# Patient Record
Sex: FEMALE | Race: BLACK OR AFRICAN AMERICAN | Employment: FULL TIME | URBAN - METROPOLITAN AREA
[De-identification: names, ages, dates, MRNs, and addresses within clinical notes are randomized per-mention and may not be internally consistent; named-entity substitution may affect disease eponyms.]

---

## 2018-07-20 LAB
CHLAMYDIA, EXTERNAL: NEGATIVE
N. GONORRHEA, EXTERNAL: NEGATIVE

## 2018-07-31 LAB
ANTIBODY SCREEN, EXTERNAL: NEGATIVE
HBSAG, EXTERNAL: NEGATIVE
HCT, EXTERNAL: 35.7
HGB, EXTERNAL: 11.6
HIV, EXTERNAL: NEGATIVE
RUBELLA, EXTERNAL: NORMAL
T. PALLIDUM, EXTERNAL: NORMAL
TYPE, ABO & RH, EXTERNAL: NORMAL

## 2018-08-13 LAB — GTT, 1 HR, GLUCOLA, EXTERNAL: 87

## 2018-09-25 ENCOUNTER — HOSPITAL ENCOUNTER (OUTPATIENT)
Dept: PERINATAL CARE | Age: 28
Discharge: HOME | End: 2018-09-25
Attending: OBSTETRICS & GYNECOLOGY
Payer: OTHER GOVERNMENT

## 2018-09-25 PROCEDURE — 76811 OB US DETAILED SNGL FETUS: CPT | Performed by: OBSTETRICS & GYNECOLOGY

## 2018-11-15 ENCOUNTER — HOSPITAL ENCOUNTER (EMERGENCY)
Age: 28
Discharge: HOME | End: 2018-11-15
Attending: OBSTETRICS & GYNECOLOGY | Admitting: OBSTETRICS & GYNECOLOGY
Payer: OTHER GOVERNMENT

## 2018-11-15 VITALS
HEART RATE: 101 BPM | OXYGEN SATURATION: 100 % | RESPIRATION RATE: 20 BRPM | WEIGHT: 293 LBS | TEMPERATURE: 98.6 F | BODY MASS INDEX: 47.09 KG/M2 | DIASTOLIC BLOOD PRESSURE: 69 MMHG | SYSTOLIC BLOOD PRESSURE: 127 MMHG | HEIGHT: 66 IN

## 2018-11-15 PROCEDURE — 99282 EMERGENCY DEPT VISIT SF MDM: CPT

## 2018-11-15 PROCEDURE — 75810000275 HC EMERGENCY DEPT VISIT NO LEVEL OF CARE

## 2018-11-15 PROCEDURE — 93005 ELECTROCARDIOGRAM TRACING: CPT

## 2018-11-15 PROCEDURE — 74011250637 HC RX REV CODE- 250/637: Performed by: OBSTETRICS & GYNECOLOGY

## 2018-11-15 RX ORDER — PROGESTERONE 200 MG/1
200 CAPSULE ORAL DAILY
Status: ON HOLD | COMMUNITY
End: 2019-01-12

## 2018-11-15 RX ADMIN — ALUMINUM HYDROXIDE AND MAGNESIUM HYDROXIDE 15 ML: 200; 200 SUSPENSION ORAL at 20:36

## 2018-11-16 LAB
ATRIAL RATE: 116 BPM
CALCULATED P AXIS, ECG09: 69 DEGREES
CALCULATED R AXIS, ECG10: 14 DEGREES
CALCULATED T AXIS, ECG11: 48 DEGREES
DIAGNOSIS, 93000: NORMAL
P-R INTERVAL, ECG05: 136 MS
Q-T INTERVAL, ECG07: 310 MS
QRS DURATION, ECG06: 76 MS
QTC CALCULATION (BEZET), ECG08: 430 MS
VENTRICULAR RATE, ECG03: 116 BPM

## 2018-11-16 NOTE — H&P
History & Physical    Name: Smooth Jj MRN: [id number]  SSN: [id number]    YOB: 1990  Age: 29 y.o. Sex: female        Subjective:     Estimated Date of Delivery: None noted. OB History    Para Term  AB Living   5 2 1 1 2     SAB TAB Ectopic Molar Multiple Live Births   1 1              # Outcome Date GA Lbr Yuan/2nd Weight Sex Delivery Anes PTL Lv   5 Current            4 Term 2017 38w0d    Vag-Spont      3 TAB 2015           2   23w0d          1 SAB  8w0d                 Ms. Barry Gutiérrez is seen with pregnancy at 31 weeks for c/o chest pain which whe describes has burning sensation. Patient tried joe tea without relief Prenatal course was complicated by h/o FDIU at 24 weeks. Please see prenatal records for details. Past Medical History:   Diagnosis Date    Abnormal Papanicolaou smear of cervix     Gestational hypertension     previous pregnancy     History reviewed. No pertinent surgical history. Social History     Occupational History    Not on file   Tobacco Use    Smoking status: Never Smoker    Smokeless tobacco: Never Used   Substance and Sexual Activity    Alcohol use: No     Frequency: Never    Drug use: No    Sexual activity: Not on file     History reviewed. No pertinent family history. No Known Allergies  Prior to Admission medications    Medication Sig Start Date End Date Taking? Authorizing Provider   progesterone (PROMETRIUM) 200 mg capsule Insert 200 mg into vagina daily. Yes Provider, Historical   PNV No12-Iron-FA-DSS-OM-3 29 mg iron-1 mg -50 mg CPKD Take  by mouth.    Yes Provider, Historical        Review of Systems: Constitutional: negative  Respiratory: negative  Cardiovascular: negative  Gastrointestinal: negative  Genitourinary:negative  Musculoskeletal:negative  Neurological: negative  Behavioral/Psych: negative  Endocrine: negative  Allergic/Immunologic: negative    Objective:     Vitals:  Vitals:    11/15/18 1926 11/15/18 193 11/15/18 2008   BP: 136/81 127/69    Pulse: (!) 113 (!) 101    Resp: 18 20    Temp: 98.5 °F (36.9 °C) 98.6 °F (37 °C)    SpO2: 100%     Weight:   137.4 kg (303 lb)   Height:   5' 6\" (1.676 m)        Physical Exam:  Patient without distress.   Heart: Regular rate and rhythm  Lung: clear to auscultation throughout lung fields and normal respiratory effort  Abdomen: soft, nontender  Perineum: blood absent, amniotic fluid absent  Cervical Exam: deferred  Membranes:  Intact  Fetal Heart Rate: 130s cat I tracing appropriate for EGA     Prenatal Labs:   No results found for: ABORH, RUBELLAEXT, GRBSEXT, HBSAGEXT, HIVEXT, RPREXT, GONNOEXT, CHLAMEXT, ABORHEXT, RUBELLAEXT, GRBSEXT, HBSAGEXT, HIVEXT, RPREXT, GONNOEXT, CHLAMEXT      Assessment/Plan:     Heartburn     Plan  Patient given mallox and prilosec with relief  F/u in office with private MD       Signed By:  Stanley Ponce MD     November 15, 2018

## 2018-11-16 NOTE — ED PROVIDER NOTES
29 y.o. female with no significant past medical history presents with chief complaint of chest pain and lower abd pain that started around approximately 3 PM today. Pt reports that the pain does not feel like contractions. Pt adds that she is 31 weeks pregnant. There are no other acute medical concerns at this time. Social hx: None reported PCP: None Note written by Rebecca Lopez, as dictated by No att. providers found 7:24 PM 
 
 
 
The history is provided by the patient. No  was used. No past medical history on file. No past surgical history on file. No family history on file. Social History Socioeconomic History  Marital status:  Spouse name: Not on file  Number of children: Not on file  Years of education: Not on file  Highest education level: Not on file Social Needs  Financial resource strain: Not on file  Food insecurity - worry: Not on file  Food insecurity - inability: Not on file  Transportation needs - medical: Not on file  Transportation needs - non-medical: Not on file Occupational History  Not on file Tobacco Use  Smoking status: Not on file Substance and Sexual Activity  Alcohol use: Not on file  Drug use: Not on file  Sexual activity: Not on file Other Topics Concern  Not on file Social History Narrative  Not on file ALLERGIES: Patient has no known allergies. Review of Systems There were no vitals filed for this visit. Physical Exam  
 
MDM Procedures

## 2018-11-16 NOTE — DISCHARGE INSTRUCTIONS

## 2018-11-16 NOTE — PROGRESS NOTES
1935:  Patient to L&D for chest pain and some abdominal discomfort. She states she began with chest pain last night that is constant and right behind her breast bone. After working today she began to have abdominal discomfort along with chest pain and thought she would come in to be checked. 1945:  RN alerting Darwin Preston MD of patients arrival and history. Order received for maalox. VORB. 2015Emmett Francis MD at bedside discussing plan of care. Order received for discharge. 2045:  Discharge instructions reviewed with patient by Zee Ford RN.    2100:  Patient ambulatory OTD.

## 2019-01-05 ENCOUNTER — HOSPITAL ENCOUNTER (EMERGENCY)
Age: 29
Discharge: HOME | End: 2019-01-05
Payer: OTHER GOVERNMENT

## 2019-01-05 VITALS
SYSTOLIC BLOOD PRESSURE: 127 MMHG | TEMPERATURE: 99.2 F | HEART RATE: 92 BPM | DIASTOLIC BLOOD PRESSURE: 67 MMHG | RESPIRATION RATE: 22 BRPM

## 2019-01-05 LAB
DAILY QC (YES/NO)?: YES
PH, VAGINAL FLUID: 5 (ref 5–6.1)

## 2019-01-05 PROCEDURE — 99283 EMERGENCY DEPT VISIT LOW MDM: CPT

## 2019-01-05 PROCEDURE — 83986 ASSAY PH BODY FLUID NOS: CPT | Performed by: OBSTETRICS & GYNECOLOGY

## 2019-01-05 PROCEDURE — 75810000275 HC EMERGENCY DEPT VISIT NO LEVEL OF CARE

## 2019-01-05 PROCEDURE — 74011250637 HC RX REV CODE- 250/637: Performed by: OBSTETRICS & GYNECOLOGY

## 2019-01-05 PROCEDURE — 59025 FETAL NON-STRESS TEST: CPT

## 2019-01-05 RX ORDER — ZOLPIDEM TARTRATE 5 MG/1
10 TABLET ORAL
Status: DISCONTINUED | OUTPATIENT
Start: 2019-01-05 | End: 2019-01-06 | Stop reason: HOSPADM

## 2019-01-05 RX ADMIN — ZOLPIDEM TARTRATE 10 MG: 5 TABLET ORAL at 21:49

## 2019-01-06 NOTE — H&P
History & Physical    Name: Loren Ashraf MRN: [id number]  SSN: [id number]    YOB: 1990  Age: 29 y.o. Sex: female        Subjective:     Estimated Date of Delivery: 19  OB History    Para Term  AB Living   5 2 1 1 2     SAB TAB Ectopic Molar Multiple Live Births   1 1              # Outcome Date GA Lbr Yuan/2nd Weight Sex Delivery Anes PTL Lv   5 Current            4 Term 2017 38w0d    Vag-Spont      3 TAB 2015           2   23w0d          1 SAB  8w0d                 Ms. Leigha Flores is seen with pregnancy at 38w2d for possible SROM. Prenatal course was normal. Please see prenatal records for details. Past Medical History:   Diagnosis Date    Abnormal Papanicolaou smear of cervix     Gestational hypertension     previous pregnancy     No past surgical history on file. Social History     Occupational History    Not on file   Tobacco Use    Smoking status: Never Smoker    Smokeless tobacco: Never Used   Substance and Sexual Activity    Alcohol use: No     Frequency: Never    Drug use: No    Sexual activity: Not on file     No family history on file. No Known Allergies  Prior to Admission medications    Medication Sig Start Date End Date Taking? Authorizing Provider   PNV No12-Iron-FA-DSS-OM-3 29 mg iron-1 mg -50 mg CPKD Take  by mouth. Yes Provider, Historical   progesterone (PROMETRIUM) 200 mg capsule Insert 200 mg into vagina daily. Provider, Historical        Review of Systems: Constitutional: negative  Respiratory: negative  Cardiovascular: negative  Gastrointestinal: negative  Genitourinary:negative  Hematologic/lymphatic: negative  Musculoskeletal:negative  Neurological: negative  Behavioral/Psych: negative  Endocrine: negative    Objective:     Vitals: There were no vitals filed for this visit. Physical Exam:  Patient without distress.   Heart: Regular rate and rhythm  Lung: clear to auscultation throughout lung fields and normal respiratory effort  Abdomen: soft, nontender  Perineum: blood absent, amniotic fluid absent  Cervical Exam: 4/50/-2  Membranes:  Intact  Fetal Heart Rate: cat I tracing without decels uterine irritability noted     Prenatal Labs:   No results found for: ABORH, RUBELLAEXT, GRBSEXT, HBSAGEXT, HIVEXT, RPREXT, GONNOEXT, CHLAMEXT, ABORHEXT, RUBELLAEXT, GRBSEXT, HBSAGEXT, HIVEXT, RPREXT, GONNOEXT, CHLAMEXT      Assessment/Plan:   False labor  No leakage of fluid in vagina      Plan: discharge to home with office follow up     Signed By:  Ta Barrow MD     January 5, 2019

## 2019-01-06 NOTE — PROGRESS NOTES
2044 - Patient finished changing, here with complaints of contractions every 20 mins. All day. 4 cm in office yesterday, positive fetal movement. Questions leaking fluid or vaginal discharge. 2111 - Dr. Hernandez Way to room, SVE done by Dr. Hernandez Way and cervix unchanged. Nitrazine negative. To obtain reactive NST and patient may be discharged. Denies need for pain management. Was comfortable at home in bath. Also decline sleep aid at this time. 2135 Patient changed mind and would like sleep aid. Spoke with Dr. Hernandez Way and Asadien ordered patient not driving. 2148 - Discharge instructions reviewed and copy given to patient. S. O. Driving and questions answered.

## 2019-01-06 NOTE — DISCHARGE INSTRUCTIONS
Patient Education   Patient Education      Patient Education     Do not drive after taking ambien. Stay well hydrated,     Week 38 of Your Pregnancy: Care Instructions  Your Care Instructions    Believe it or not, your baby is almost here. You may have ideas about your baby's personality because of how much he or she moves. Or you may have noticed how he or she responds to sounds, warmth, cold, and light. You may even know what kind of music your baby likes. By now, you have a better idea of what to expect during delivery. You may have talked about your birth preferences with your doctor. But even if you want a vaginal birth, it is a good idea to learn about  births.  birth means that your baby is born through a cut (incision) in your lower belly. It is sometimes the best choice for the health of the baby and the mother. This care sheet can help you understand  births. It also gives you information about what to expect after your baby is born. And it helps you understand more about postpartum depression. Follow-up care is a key part of your treatment and safety. Be sure to make and go to all appointments, and call your doctor if you are having problems. It's also a good idea to know your test results and keep a list of the medicines you take. How can you care for yourself at home? Learn about  birth  · Most C-sections are unplanned. They are done because of problems that occur during labor. These problems might include:  ? Labor that slows or stops. ? High blood pressure or other problems for the mother. ? Signs of distress in the baby. These signs may include a very fast or slow heart rate. · Although most mothers and babies do well after , it is major surgery. It has more risks than a vaginal delivery. · In some cases, a planned  may be safer than a vaginal delivery. This may be the case if:  ?  The mother has a health problem, such as a heart condition. ? The baby isn't in a head-down position for delivery. This is called a breech position. ? The uterus has scars from past surgeries. This could increase the chance of a tear in the uterus. ? There is a problem with the placenta. ? The mother has an infection, such as genital herpes, that could be spread to the baby. ? The mother is having twins or more. ? The baby weighs 9 to 10 pounds or more. · Because of the risks of , planned C-sections generally should be done only for medical reasons. And a planned  should be done at 39 weeks or later unless there is a medical reason to do it sooner. Know what to expect after delivery, and plan for the first few weeks at home  · You, your baby, and your partner or  will get identification bands. Only people with matching bands can  the baby from the nursery. · You will learn how to feed, diaper, and bathe your baby. And you will learn how to care for the umbilical cord stump. If your baby will be circumcised, you will also learn how to care for that. · Ask people to wait to visit you until you are at home. And ask them to wash their hands before they touch your baby. · Make sure you have another adult in your home for at least 2 or 3 days after the birth. · During the first 2 weeks, limit when friends and family can visit. · Do not allow visitors who have colds or infections. Make sure all visitors are up to date with their vaccinations. Never let anyone smoke around your baby. · Try to nap when the baby naps. Be aware of postpartum depression  · \"Baby blues\" are common for the first 1 to 2 weeks after birth. You may cry or feel sad or irritable for no reason. · For some women, these feelings last longer and are more intense. This is called postpartum depression. · If your symptoms last for more than a few weeks or you feel very depressed, ask your doctor for help. · Postpartum depression can be treated.  Support groups and counseling can help. Sometimes medicine can also help. Where can you learn more? Go to http://narcisa-kwame.info/. Enter B044 in the search box to learn more about \"Week 38 of Your Pregnancy: Care Instructions. \"  Current as of: 2017  Content Version: 11.8  © 2737-5926 [employer]. Care instructions adapted under license by [employer] (which disclaims liability or warranty for this information). If you have questions about a medical condition or this instruction, always ask your healthcare professional. [place] [address] any warranty or liability for your use of this information. Pregnancy Precautions: Care Instructions  Your Care Instructions    There is no sure way to prevent labor before your due date ( labor) or to prevent most other pregnancy problems. But there are things you can do to increase your chances of a healthy pregnancy. Go to your appointments, follow your doctor's advice, and take good care of yourself. Eat well, and exercise (if your doctor agrees). And make sure to drink plenty of water. Follow-up care is a key part of your treatment and safety. Be sure to make and go to all appointments, and call your doctor if you are having problems. It's also a good idea to know your test results and keep a list of the medicines you take. How can you care for yourself at home? · Make sure you go to your prenatal appointments. At each visit, your doctor will check your blood pressure. Your doctor will also check to see if you have protein in your urine. High blood pressure and protein in urine are signs of preeclampsia. This condition can be dangerous for you and your baby. · Drink plenty of fluids, enough so that your urine is light yellow or clear like water. Dehydration can cause contractions.  If you have kidney, heart, or liver disease and have to limit fluids, talk with your doctor before you increase the amount of fluids you drink. · Tell your doctor right away if you notice any symptoms of an infection, such as:  ? Burning when you urinate. ? A foul-smelling discharge from your vagina. ? Vaginal itching. ? Unexplained fever. ? Unusual pain or soreness in your uterus or lower belly. · Eat a balanced diet. Include plenty of foods that are high in calcium and iron. ? Foods high in calcium include milk, cheese, yogurt, almonds, and broccoli. ? Foods high in iron include red meat, shellfish, poultry, eggs, beans, raisins, whole-grain bread, and leafy green vegetables. · Do not smoke. If you need help quitting, talk to your doctor about stop-smoking programs and medicines. These can increase your chances of quitting for good. · Do not drink alcohol or use illegal drugs. · Follow your doctor's directions about activity. Your doctor will let you know how much, if any, exercise you can do. · Ask your doctor if you can have sex. If you are at risk for early labor, your doctor may ask you to not have sex. · Take care to prevent falls. During pregnancy, your joints are loose, and your balance is off. Sports such as bicycling, skiing, or in-line skating can increase your risk of falling. And don't ride horses or motorcycles, dive, water ski, scuba dive, or parachute jump while you are pregnant. · Avoid getting very hot. Do not use saunas or hot tubs. Avoid staying out in the sun in hot weather for long periods. Take acetaminophen (Tylenol) to lower a high fever. · Do not take any over-the-counter or herbal medicines or supplements without talking to your doctor or pharmacist first.  When should you call for help? Call 911 anytime you think you may need emergency care.  For example, call if:    · You passed out (lost consciousness).     · You have severe vaginal bleeding.     · You have severe pain in your belly or pelvis.     · You have had fluid gushing or leaking from your vagina and you know or think the umbilical cord is bulging into your vagina. If this happens, immediately get down on your knees so your rear end (buttocks) is higher than your head. This will decrease the pressure on the cord until help arrives.   [facility] your doctor now or seek immediate medical care if:    · You have signs of preeclampsia, such as:  ? Sudden swelling of your face, hands, or feet. ? New vision problems (such as dimness or blurring). ? A severe headache.     · You have any vaginal bleeding.     · You have belly pain or cramping.     · You have a fever.     · You have had regular contractions (with or without pain) for an hour. This means that you have 8 or more within 1 hour or 4 or more in 20 minutes after you change your position and drink fluids.     · You have a sudden release of fluid from your vagina.     · You have low back pain or pelvic pressure that does not go away.     · You notice that your baby has stopped moving or is moving much less than normal.    Watch closely for changes in your health, and be sure to contact your doctor if you have any problems. Where can you learn more? Go to http://narcisa-kwame.info/. Enter [id number] in the search box to learn more about \"Pregnancy Precautions: Care Instructions. \"  Current as of: November 21, 2017  Content Version: 11.8  © 6257-4658 [employer]. Care instructions adapted under license by [employer] (which disclaims liability or warranty for this information). If you have questions about a medical condition or this instruction, always ask your healthcare professional. [place] [address] any warranty or liability for your use of this information. Counting Your Baby's Kicks: Care Instructions  Your Care Instructions    Counting your baby's kicks is one way your doctor can tell that your baby is healthy.  Most women--especially in a first pregnancy--feel their baby move for the first time between 12 and 22 weeks. The movement may feel like flutters rather than kicks. Your baby may move more at certain times of the day. When you are active, you may notice less kicking than when you are resting. At your prenatal visits, your doctor will ask whether the baby is active. In your last trimester, your doctor may ask you to count the number of times you feel your baby move. Follow-up care is a key part of your treatment and safety. Be sure to make and go to all appointments, and call your doctor if you are having problems. It's also a good idea to know your test results and keep a list of the medicines you take. How do you count fetal kicks? · A common method of checking your baby's movement is to count the number of kicks or moves you feel in 1 hour. Ten movements (such as kicks, flutters, or rolls) in 1 hour are normal. Some doctors suggest that you count in the morning until you get to 10 movements. Then you can quit for that day and start again the next day. · Pick your baby's most active time of day to count. This may be any time from morning to evening. · If you do not feel 10 movements in an hour, your baby may be sleeping. Wait for the next hour and count again. When should you call for help? Call your doctor now or seek immediate medical care if:    · You noticed that your baby has stopped moving or is moving much less than normal.    Watch closely for changes in your health, and be sure to contact your doctor if you have any problems. Where can you learn more? Go to http://narcisa-kwame.info/. Enter D075 in the search box to learn more about \"Counting Your Baby's Kicks: Care Instructions. \"  Current as of: November 21, 2017  Content Version: 11.8  © 8571-3308 [employer]. Care instructions adapted under license by [employer] (which disclaims liability or warranty for this information).  If you have questions about a medical condition or this instruction, always ask your healthcare professional. [place] [address] any warranty or liability for your use of this information.

## 2019-01-12 ENCOUNTER — HOSPITAL ENCOUNTER (INPATIENT)
Age: 29
LOS: 2 days | Discharge: HOME | End: 2019-01-14
Attending: OBSTETRICS & GYNECOLOGY | Admitting: OBSTETRICS & GYNECOLOGY
Payer: OTHER GOVERNMENT

## 2019-01-12 PROBLEM — Z34.90 PREGNANCY: Status: ACTIVE | Noted: 2019-01-12

## 2019-01-12 LAB
BASOPHILS # BLD: 0 K/UL (ref 0–0.1)
BASOPHILS NFR BLD: 0 % (ref 0–1)
DIFFERENTIAL METHOD BLD: ABNORMAL
EOSINOPHIL # BLD: 0.2 K/UL (ref 0–0.4)
EOSINOPHIL NFR BLD: 2 % (ref 0–7)
ERYTHROCYTE [DISTWIDTH] IN BLOOD BY AUTOMATED COUNT: 13.7 % (ref 11.5–14.5)
HCT VFR BLD AUTO: 36.7 % (ref 35–47)
HGB BLD-MCNC: 12.2 G/DL (ref 11.5–16)
IMM GRANULOCYTES # BLD AUTO: 0.1 K/UL (ref 0–0.04)
IMM GRANULOCYTES NFR BLD AUTO: 1 % (ref 0–0.5)
LYMPHOCYTES # BLD: 2.1 K/UL (ref 0.8–3.5)
LYMPHOCYTES NFR BLD: 19 % (ref 12–49)
MCH RBC QN AUTO: 30.1 PG (ref 26–34)
MCHC RBC AUTO-ENTMCNC: 33.2 G/DL (ref 30–36.5)
MCV RBC AUTO: 90.6 FL (ref 80–99)
MONOCYTES # BLD: 0.7 K/UL (ref 0–1)
MONOCYTES NFR BLD: 6 % (ref 5–13)
NEUTS SEG # BLD: 7.9 K/UL (ref 1.8–8)
NEUTS SEG NFR BLD: 72 % (ref 32–75)
NRBC # BLD: 0 K/UL (ref 0–0.01)
NRBC BLD-RTO: 0 PER 100 WBC
PLATELET # BLD AUTO: 268 K/UL (ref 150–400)
PMV BLD AUTO: 10 FL (ref 8.9–12.9)
RBC # BLD AUTO: 4.05 M/UL (ref 3.8–5.2)
WBC # BLD AUTO: 10.9 K/UL (ref 3.6–11)

## 2019-01-12 PROCEDURE — 74011250637 HC RX REV CODE- 250/637: Performed by: OBSTETRICS & GYNECOLOGY

## 2019-01-12 PROCEDURE — 75410000000 HC DELIVERY VAGINAL/SINGLE: Performed by: OBSTETRICS & GYNECOLOGY

## 2019-01-12 PROCEDURE — 36415 COLL VENOUS BLD VENIPUNCTURE: CPT

## 2019-01-12 PROCEDURE — 75410000003 HC RECOV DEL/VAG/CSECN EA 0.5 HR: Performed by: OBSTETRICS & GYNECOLOGY

## 2019-01-12 PROCEDURE — 77010026065 HC OXYGEN MINIMUM MEDICAL AIR: Performed by: OBSTETRICS & GYNECOLOGY

## 2019-01-12 PROCEDURE — 74011250636 HC RX REV CODE- 250/636: Performed by: OBSTETRICS & GYNECOLOGY

## 2019-01-12 PROCEDURE — 74011000258 HC RX REV CODE- 258: Performed by: OBSTETRICS & GYNECOLOGY

## 2019-01-12 PROCEDURE — 65270000029 HC RM PRIVATE

## 2019-01-12 PROCEDURE — 75410000002 HC LABOR FEE PER 1 HR: Performed by: OBSTETRICS & GYNECOLOGY

## 2019-01-12 PROCEDURE — 10907ZC DRAINAGE OF AMNIOTIC FLUID, THERAPEUTIC FROM PRODUCTS OF CONCEPTION, VIA NATURAL OR ARTIFICIAL OPENING: ICD-10-PCS | Performed by: OBSTETRICS & GYNECOLOGY

## 2019-01-12 PROCEDURE — 85025 COMPLETE CBC W/AUTO DIFF WBC: CPT

## 2019-01-12 RX ORDER — DIPHENHYDRAMINE HCL 25 MG
25 CAPSULE ORAL
Status: DISCONTINUED | OUTPATIENT
Start: 2019-01-12 | End: 2019-01-14 | Stop reason: HOSPADM

## 2019-01-12 RX ORDER — ONDANSETRON 4 MG/1
4 TABLET, ORALLY DISINTEGRATING ORAL
Status: DISCONTINUED | OUTPATIENT
Start: 2019-01-12 | End: 2019-01-14 | Stop reason: HOSPADM

## 2019-01-12 RX ORDER — LIDOCAINE HYDROCHLORIDE 10 MG/ML
INJECTION INFILTRATION; PERINEURAL
Status: DISCONTINUED
Start: 2019-01-12 | End: 2019-01-12 | Stop reason: HOSPADM

## 2019-01-12 RX ORDER — OXYTOCIN/0.9 % SODIUM CHLORIDE 30/500 ML
PLASTIC BAG, INJECTION (ML) INTRAVENOUS
Status: DISCONTINUED
Start: 2019-01-12 | End: 2019-01-12 | Stop reason: HOSPADM

## 2019-01-12 RX ORDER — SIMETHICONE 80 MG
80 TABLET,CHEWABLE ORAL
Status: DISCONTINUED | OUTPATIENT
Start: 2019-01-12 | End: 2019-01-14 | Stop reason: HOSPADM

## 2019-01-12 RX ORDER — IBUPROFEN 600 MG/1
600 TABLET ORAL
Status: DISCONTINUED | OUTPATIENT
Start: 2019-01-12 | End: 2019-01-14 | Stop reason: HOSPADM

## 2019-01-12 RX ORDER — SODIUM CHLORIDE, SODIUM LACTATE, POTASSIUM CHLORIDE, CALCIUM CHLORIDE 600; 310; 30; 20 MG/100ML; MG/100ML; MG/100ML; MG/100ML
125 INJECTION, SOLUTION INTRAVENOUS CONTINUOUS
Status: DISCONTINUED | OUTPATIENT
Start: 2019-01-12 | End: 2019-01-14 | Stop reason: HOSPADM

## 2019-01-12 RX ORDER — ZOLPIDEM TARTRATE 5 MG/1
5 TABLET ORAL
Status: DISCONTINUED | OUTPATIENT
Start: 2019-01-12 | End: 2019-01-14 | Stop reason: HOSPADM

## 2019-01-12 RX ORDER — OXYCODONE AND ACETAMINOPHEN 5; 325 MG/1; MG/1
1 TABLET ORAL
Status: DISCONTINUED | OUTPATIENT
Start: 2019-01-12 | End: 2019-01-14 | Stop reason: HOSPADM

## 2019-01-12 RX ORDER — HYDROCORTISONE ACETATE PRAMOXINE HCL 2.5; 1 G/100G; G/100G
CREAM TOPICAL AS NEEDED
Status: DISCONTINUED | OUTPATIENT
Start: 2019-01-12 | End: 2019-01-14 | Stop reason: HOSPADM

## 2019-01-12 RX ORDER — NALOXONE HYDROCHLORIDE 0.4 MG/ML
0.4 INJECTION, SOLUTION INTRAMUSCULAR; INTRAVENOUS; SUBCUTANEOUS AS NEEDED
Status: DISCONTINUED | OUTPATIENT
Start: 2019-01-12 | End: 2019-01-12 | Stop reason: SDUPTHER

## 2019-01-12 RX ORDER — SODIUM CHLORIDE 0.9 % (FLUSH) 0.9 %
5-40 SYRINGE (ML) INJECTION AS NEEDED
Status: DISCONTINUED | OUTPATIENT
Start: 2019-01-12 | End: 2019-01-14 | Stop reason: HOSPADM

## 2019-01-12 RX ORDER — OXYTOCIN/RINGER'S LACTATE 20/1000 ML
125-500 PLASTIC BAG, INJECTION (ML) INTRAVENOUS ONCE
Status: ACTIVE | OUTPATIENT
Start: 2019-01-12 | End: 2019-01-13

## 2019-01-12 RX ORDER — SODIUM CHLORIDE 0.9 % (FLUSH) 0.9 %
5-40 SYRINGE (ML) INJECTION EVERY 8 HOURS
Status: DISCONTINUED | OUTPATIENT
Start: 2019-01-12 | End: 2019-01-14 | Stop reason: HOSPADM

## 2019-01-12 RX ORDER — BISACODYL 5 MG
5 TABLET, DELAYED RELEASE (ENTERIC COATED) ORAL DAILY PRN
Status: DISCONTINUED | OUTPATIENT
Start: 2019-01-12 | End: 2019-01-14 | Stop reason: HOSPADM

## 2019-01-12 RX ORDER — NALOXONE HYDROCHLORIDE 0.4 MG/ML
0.4 INJECTION, SOLUTION INTRAMUSCULAR; INTRAVENOUS; SUBCUTANEOUS AS NEEDED
Status: DISCONTINUED | OUTPATIENT
Start: 2019-01-12 | End: 2019-01-14 | Stop reason: HOSPADM

## 2019-01-12 RX ADMIN — SODIUM CHLORIDE, SODIUM LACTATE, POTASSIUM CHLORIDE, AND CALCIUM CHLORIDE 125 ML/HR: 600; 310; 30; 20 INJECTION, SOLUTION INTRAVENOUS at 14:16

## 2019-01-12 RX ADMIN — Medication 10 ML: at 14:17

## 2019-01-12 RX ADMIN — SODIUM CHLORIDE 5 MILLION UNITS: 900 INJECTION, SOLUTION INTRAVENOUS at 14:31

## 2019-01-12 RX ADMIN — IBUPROFEN 600 MG: 600 TABLET ORAL at 20:15

## 2019-01-12 NOTE — PROCEDURES
[facility] [place] [facility]  PROCEDURE NOTE    Annamaria Winslow  MR#: [id number]  : 1990  ACCOUNT #: [de-identified]   DATE OF SERVICE: 2019    At 1646 hours, there was a normal spontaneous vaginal delivery of a live female infant over an intact perineum. Baby was delivered easily. Cord was double clamped and cut. It was a 3-vessel cord after a minute. [de-identified] sex is female, Apgars 9 at 1 minute and 9 at 5 minutes respectively. IV Pitocin was running. Placenta was delivered spontaneously. Uterus was found to be slightly less contracted. I massaged the uterus and there was a little bit of placental tissue that was I able to remove from the cervical os and the uterus clamped down was well contracted with the Pitocin running. Pelvic and rectal examination was done, found to be intact. Estimated blood loss was less than 500 mL. Fetal heart rate was category 1. Sponge and instrument counts were correct. Both mother and baby were in stable condition at the end of the delivery.       MD CICI Rice / [place]  D: 2019 17:07     T: 2019 17:13  JOB #: [id number]

## 2019-01-12 NOTE — L&D DELIVERY NOTE
Delivery Summary    Patient: Win Fields MRN: [id number]  SSN: [id number]    YOB: 1990  Age: 29 y.o. Sex: female       Information for the patient's :  Tiffanie Barron [[id number]]       Labor Events:    Labor: No   Rupture Date: 2019   Rupture Time:     Rupture Type AROM   Amniotic Fluid Volume: Moderate    Amniotic Fluid Description: Clear None   Induction: None       Augmentation: AROM   Labor Events: None     Cervical Ripening:     None     Delivery Events:  Episiotomy: None   Laceration(s): None     Repaired: None    Number of Repair Packets:     Suture Type and Size: None     Estimated Blood Loss (ml):  ml       Delivery Date: 2019    Delivery Time: 4:46 PM  Delivery Type: Vaginal, Spontaneous  Sex:  Female     Gestational Age: 44w2d   Delivery Clinician:  Lillie Tyler  Living Status: Living   Delivery Location: L&D            APGARS  One minute Five minutes Ten minutes   Skin color: 1   1        Heart rate: 2   2        Grimace: 2   2        Muscle tone: 2   2        Breathin   2        Totals: 9   9            Presentation: Vertex    Position:   Occiput Anterior  Resuscitation Method:  Tactile Stimulation     Meconium Stained: None      Cord Information: 3 Vessels  Complications: None  Cord around:    Delayed cord clamping? Yes  Cord clamped date/time:2019  4:47 PM  Disposition of Cord Blood: Discard    Blood Gases Sent?: No    Placenta:  Date/Time: 2019  4:50 PM  Removal: Spontaneous      Appearance: Normal      Measurements:  Birth Weight:        Birth Length:        Head Circumference:        Chest Circumference:       Abdominal Girth:       Other Providers:   LILLIE TYLER;PACO TALAVERA;CLINTON JUAREZ DANIELLE Trisha Kirsch, Obstetrician;Primary Nurse;Primary  Nurse;Charge Nurse;Staff Nurse           Group B Strep: No results found for: GRBSEXT, GRBSEXT  Information for the patient's :  Juan Vaz Female Kurt Quiles [[id number]]   No results found for: ABORH, PCTABR, PCTDIG, BILI, ABORHEXT, ABORH    No results for input(s): PCO2CB, PO2CB, HCO3I, SO2I, IBD, PTEMPI, SPECTI, PHICB, ISITE, IDEV, IALLEN in the last 72 hours.

## 2019-01-12 NOTE — H&P
History & Physical 
 
Name: Katherin Fajardo MRN: [id number]  SSN: [id number] YOB: 1990  Age: 29 y.o. Sex: female Subjective:  
 
Estimated Date of Delivery: 19 OB History  Para Term  AB Living 5 2 0 2 2 SAB TAB Ectopic Molar Multiple Live Births 1 1 Ms. Aleisha Felipe is admitted with pregnancy at 39w2d for active labor. Prenatal course was complicated by morbid obesity, prior  birth. Please see prenatal records for details. Past Medical History:  
Diagnosis Date  Abnormal Papanicolaou smear of cervix  Gestational hypertension   
 previous pregnancy No past surgical history on file. Social History Occupational History  Not on file Tobacco Use  Smoking status: Never Smoker  Smokeless tobacco: Never Used Substance and Sexual Activity  Alcohol use: No  
  Frequency: Never  Drug use: No  
 Sexual activity: Yes  
  Partners: Male Birth control/protection: None No family history on file. No Known Allergies Prior to Admission medications Medication Sig Start Date End Date Taking? Authorizing Provider PNV [id number] 29 mg iron-1 mg -50 mg CPKD Take  by mouth. Yes Provider, Historical  
progesterone (PROMETRIUM) 200 mg capsule Insert 200 mg into vagina daily. Provider, Historical  
  
 
Review of Systems: A comprehensive review of systems was negative except for that written in the HPI. Objective:  
 
Vitals: 
Vitals:  
 19 1304 Weight: 131.5 kg (290 lb) Height: 5' 6\" (1.676 m) Physical Exam: 
Patient without distress. Heart: S1S2 present Lung: clear to auscultation throughout lung fields, no wheezes, no rales, no rhonchi and normal respiratory effort Back: costovertebral angle tenderness absent Abdomen: soft, nontender, pannus ++ Fundus: soft and non tender Perineum: blood absent, amniotic fluid absent Cervical Exam: 7 cm dilated 70% effaced -2 station Presenting Part: cephalic Cervical Position: posterior Consistency: Medium Lower Extremities:  - Edema 1+ Membranes:  Artificial Rupture of Membranes; Amniotic Fluid: medium amount of clear fluid Fetal Heart Rate: Reactive Prenatal Labs:  
No results found for: RUBELLAEXT, GRBSEXT, HBSAGEXT, HIVEXT, RPREXT, GONNOEXT, CHLAMEXT Assessment/Plan:  
 
Plan: Admit for Reassuring fetal status, Continue plan for vaginal delivery. Group B Strep was results not available. Penicillin. Signed By:  Gianna Cr MD   
 January 12, 2019

## 2019-01-12 NOTE — DISCHARGE INSTRUCTIONS
Discharge Instructions for Vaginal Delivery    Patient ID:  Win Fields  [id number]  19 y.o.  1990    Take Home Medications           Continue taking your prenatal vitamins if you are breastfeeding. Follow-up Appointment:  Follow-up with [facility] in 6 weeks. Follow-up care is a key part of your treatment and safety. Be sure to make and go to all appointments, and call your doctor if you are having problems. Its also a good idea to know your test results and keep a list of the medicines you take. Activity  Avoid anything in your vagina for 6 weeks (no intercourse, tampons, or douching). You may drive unless you are taking prescription pain medications. Climbing stairs and light lifting are ok after a vaginal delivery unless your doctor tells you not to. You may gradually work up to exercise over the next few weeks, but take it easy- you'll be tired! Diet  You may eat a regular diet but you may want to avoid heavy, greasy foods and other foods that could increase constipation. Wound care  If you have stitches, continue to rinse with a squirt bottle of warm water each time you use the bathroom for about 2 weeks. Your stitches will gradually dissolve over several weeks. Sitz baths are also helpful to keep the wound clean, encourage healing, and to help with pain associated with the stitches or hemorrhoids. You can use either a sitz bath basin or a bathtub filled with 2-3\" inches of plain warm water. Soak for 10 minutes 3 times a day. Pain Management  If you were not given a prescription pain medication, you can take over the counter pain medicines like Tylenol (acetominophen), Advil or Motrin (ibuprofen). You can take acetominophen and ibuprofen together, alternating doses every few hours.   You will get the most relief if you take the maximum dose:  · Tylenol or acetominophen 1000 mg every 6 hours (equivalent to 2 Extra Strength Tylenols every 6 hours)  · Motrin or Advil (generic ibuprofen) 800 mg every 8 hours (4 tablets or capsules every 8 hours)  If you were given a prescription pain medication, you can take ibuprofen along with it (doses as above), but not Tylenol. Use ibuprofen as the main medication, and take the prescription medication if needed for more severe pain not relieved by the ibuprofen. Your goal should be to take only the minimum necessary amounts of the prescription medication (narcotic), as these pain medicines can be habit-forming and will worsen or cause constipation. Most patients will find that within a couple of days, their pain is adequately controlled using only over-the-counter medications. A heating pad can also be very helpful for cramping or back pain. Over-the-counter hemorrhoid wipes and ointments are fine to use if you have hemorrhoids. Constipation  You may find that bowel movements are irregular after delivery and that you have a tendency to be constipated. If you have stitches (and especially if you had a more severe tear called a third- or fourth-degree), your bowel movements will be more comfortable if they are soft. A stool softener such as Colace (docusate) can safely be taken daily if needed. If you become constipated you can use a laxative such as Dulcolax, Miralax or Milk of Magnesia. Don't wait until constipation is severe- take something sooner rather than later and you will feel much better! Your Recovery: What to Expect at Home  Delivering a baby is hard work and you probably aren't getting much sleep, so you will certainly be tired. Try to rest when you can and don't worry about doing housework or other tasks which can wait. If you're experiencing soreness or swelling in your bottom, this should improve over the next few days to 2 weeks. You are likely to have some back pain or general body aches or muscle soreness. This should improve with acetominophen or ibuprofen.   If your legs were swollen during your pregnancy or as a result of IV fluids given during your hospital stay, this should go away in a few days to a week. Most women experience some form of the \"Baby Blues\" after having a baby. This means that you may feel emotional, tearful, frustrated, anxious, sad, and irritable some of the time. This is normal if it's not severe and should go away after about 2 weeks. Getting as much rest as you can will help. Accept assistance from friends and family members so that you can take breaks from caring for the baby. Call your doctor if your symptoms seem severe, last more than 2 weeks, or seem to be getting worse instead of better. Get help immediately if you have thoughts of wanting to hurt yourself or others! When should you call for help? Call 911 anytime you think you may need emergency care. For example, call if:  You pass out (lose consciousness). You have sudden chest pain and shortness of breath, or you cough up blood. You have severe pain in your belly. Call your doctor now or seek immediate medical care if:  You have heavy vaginal bleeding that soaks one or more pads in an hour, or you have large clots (golf ball size or larger). Your have foul-smelling discharge from your vagina. You are sick to your stomach or cannot keep fluids down. You have pain that does not get better after you take pain medicine. You have signs of infection, such as: Increased pain in your abdomen or vaginal area  Red streaks, warmth, or tenderness of your breasts. A fever of 101 or greater (taken by mouth). You have signs of a blood clot, such as:  Pain in your calf, back of knee, thigh, or groin. Redness and swelling in your leg or groin. You have trouble passing urine or stool, especially if you have pain or swelling in your lower belly; or if you are unable to have a bowel movement after taking a laxative. You have a fast or pounding heartbeat.

## 2019-01-12 NOTE — PROGRESS NOTES
1300 - Patient arrived with reports of contractions this am beginning at 0400 with yellow muscousy discharge that is blood tinged. Patient reports being seen in office yesterday at cervix was 5cm, pt reports some spotting after cervical exam yesterday. Patient denies any LOF. Reports positive fetal movement. 1900 - Bedside and Verbal shift change report given to Marcy Lynn RN (oncoming nurse) by Daksha Richey RN (offgoing nurse). Report included the following information SBAR, Procedure Summary, Intake/Output, MAR, Recent Results and Med Rec Status.

## 2019-01-12 NOTE — DISCHARGE SUMMARY
Patient ID:  Mica Youngblood  [id number]  89 y.o.  1990    Admit Date: 2019    Discharge Date: 19     Admitting Physician: Mamadou Garza MD    Attending Physician: Waqar Travis MD    Admission Diagnoses: Pregnancy    Procedures for this admission:     Discharge Diagnoses: Same as above with vaginally producing a viable infant. Information for the patient's :  Mehnaz Douglas [[id number]]            Discharge Disposition:  home    Discharge Condition:  stable    Additional Diagnoses: labor. Maternal Labs:   Lab Results   Component Value Date/Time    HBsAg, External negative 2018    HIV, External negative 2018    Rubella, External immune 2018       Cord Blood Results:   Information for the patient's :  Mehnaz Douglas [[id number]]   No results found for: PCTABR, ABORH, PCTDIG, BILI, ABORH, ABORHEXT          History of Present Illness:   OB History      Para Term  AB Living    5 3 1 2 2 1    SAB TAB Ectopic Molar Multiple Live Births    1 1     0 1        Admitted for active labor. Hospital Course:   Patient was admitted as above and delivered via vagina . Please the chart for details. The postpartum course was unremarkable. She was deemed stable for discharge home on day 2.     Follow-up Care: 4-6 w        Signed:  Mamadou Garza MD  2019  5:04 PM

## 2019-01-13 PROCEDURE — 65270000029 HC RM PRIVATE

## 2019-01-13 PROCEDURE — 74011250637 HC RX REV CODE- 250/637: Performed by: OBSTETRICS & GYNECOLOGY

## 2019-01-13 RX ADMIN — IBUPROFEN 600 MG: 600 TABLET ORAL at 20:36

## 2019-01-13 RX ADMIN — IBUPROFEN 600 MG: 600 TABLET ORAL at 14:20

## 2019-01-13 RX ADMIN — IBUPROFEN 600 MG: 600 TABLET ORAL at 08:09

## 2019-01-13 NOTE — LACTATION NOTE
Problem: Lactation Care Plan Goal: *Infant latching appropriately Outcome: Progressing Towards Goal 
Mom states breast feeding going well. Not seen at breast.   
  
Pt will successfully establish breastfeeding by feeding in response to early feeding cues  
or wake every 3h, will obtain deep latch, and will keep log of feedings/output. Taught to BF at hunger cues and or q 2-3 hrs and to offer 10-20 drops of hand expressed colostrum at any non-feeds.   
  
Breast Assessment Left Breast: Extra large Left Nipple: Everted, Intact Right Breast: Extra large Right Nipple: Everted, Intact Breast- Feeding Assessment Attends Breast-Feeding Classes: No 
Breast-Feeding Experience: Yes(with first 2 for about a year) Breast Trauma/Surgery: No 
Type/Quality: Good(per mom, not seen at breast) 
  
  
Goal: *Weight loss less than 10% of birth weight Outcome: Progressing Towards Goal 
  
Encouraged mom to attempt feeding with baby led feeding cues. Just as sucking on fingers, rooting, mouthing. Looking for 8-12 feedings in 24 hours. Don't limit baby at breast, allow baby to come of breast on it's own. Baby may want to feed  often and may increase number of feedings on second day of life. Skin to skin encouraged.  
  
 If baby doesn't nurse,  Mom should  hand express  10-20 drops of colostrum and drip into baby's mouth, or give to baby by finger feeding, cup feeding, or spoon feeding at least every 2-3 hours.  
  
  
Problem: Patient Education: Go to Patient Education Activity Goal: Patient/Family Education Outcome: Progressing Towards Goal 
Reviewed breastfeeding basics:  Supply and demand,  stomach size, early  Feeding cues, skin to skin, positioning and baby led latch-on, assymetrical latch with signs of good, deep latch vs shallow, feeding frequency and duration, and log sheet for tracking infant feedings and output. Breastfeeding Booklet and Warm line information given.   Discussed typical  weight loss and the importance of infant weight checks with pediatrician 1-2 post discharge. 
  
Discussed with mother her plan for feeding. Reviewed the benefits of exclusive breast milk feeding during the hospital stay. Informed her of the risks of using formula to supplement in the first few days of life as well as the benefits of successful breast milk feeding; referred her to the Breastfeeding booklet about this information. She acknowledges understanding of information reviewed and states that it is her plan to breast feed her infant.   Will support her choice and offer additional information as needed.

## 2019-01-13 NOTE — PROGRESS NOTES
Bedside shift change report given to Olga Santana RN (oncoming nurse) by Angela Sanchez (offgoing nurse). Report included the following information SBAR and MAR.

## 2019-01-13 NOTE — PROGRESS NOTES
SBAR OUT Report: Mother Verbal report given to stephenie rn (full name & credentials) on this patient, who is now being transferred to miu (unit) for routine progression of care. The patient is not wearing a green \"Anesthesia-Duramorph\" band. Report consisted of patient's Situation, Background, Assessment and Recommendations (SBAR). [place] ID bands were compared with the identification form, and verified with the patient and receiving nurse. Information from the SBAR, Intake/Output, MAR and Recent Results and the [place] Report was reviewed with the receiving nurse; opportunity for questions and clarification provided.

## 2019-01-13 NOTE — PROGRESS NOTES
1915: Pt ambulated to bathroom with steady gait. Voided without difficulty. Pericare taught. Clean ice pads and underware placed on patient. Pt ambulated back to bed without incident.

## 2019-01-13 NOTE — PROGRESS NOTES
Post-Partum Day Number 1 Progress Note Alexus Loving Assessment: Doing well, post partum day 1 Plan: 1. Continue routine postpartum and perineal care as well as maternal education. 2. Discharge home tomorrow if stable. Information for the patient's :  Gary Sanchez [[id number]] Vaginal, Spontaneous Patient doing well without significant complaint. Voiding without difficulty, normal lochia. Vitals: 
Visit Vitals /67 (BP 1 Location: Left arm, BP Patient Position: At rest) Pulse 76 Temp 98.2 °F (36.8 °C) Resp 18 Ht 5' 6\" (1.676 m) Wt 131.5 kg (290 lb) Breastfeeding? Unknown BMI 46.81 kg/m² Temp (24hrs), Av.2 °F (36.8 °C), Min:98.2 °F (36.8 °C), Max:98.2 °F (36.8 °C) Exam:    
 breast   
   Patient without distress. CVS S1 S2 normal. 
    RS normal breath sounds Abdomen soft, fundus firm, no tenderness Perineum with normal lochia noted. Lower extremities are positive  for swelling, cords or no tenderness Labs:  
 
Lab Results Component Value Date/Time WBC 10.9 2019 02:11 PM  
 HGB 12.2 2019 02:11 PM  
 HCT 36.7 2019 02:11 PM  
 PLATELET 296  02:11 PM  
 Hgb, External 11.6 2018 Hct, External 35.7 2018 Recent Results (from the past 24 hour(s)) CBC WITH AUTOMATED DIFF Collection Time: 19  2:11 PM  
Result Value Ref Range WBC 10.9 3.6 - 11.0 K/uL  
 RBC 4.05 3.80 - 5.20 M/uL  
 HGB 12.2 11.5 - 16.0 g/dL HCT 36.7 35.0 - 47.0 % MCV 90.6 80.0 - 99.0 FL  
 MCH 30.1 26.0 - 34.0 PG  
 MCHC 33.2 30.0 - 36.5 g/dL  
 RDW 13.7 11.5 - 14.5 % PLATELET 141 168 - 885 K/uL MPV 10.0 8.9 - 12.9 FL  
 NRBC 0.0 0  WBC ABSOLUTE NRBC 0.00 0.00 - 0.01 K/uL NEUTROPHILS 72 32 - 75 % LYMPHOCYTES 19 12 - 49 % MONOCYTES 6 5 - 13 % EOSINOPHILS 2 0 - 7 % BASOPHILS 0 0 - 1 % IMMATURE GRANULOCYTES 1 (H) 0.0 - 0.5 % ABS. NEUTROPHILS 7.9 1.8 - 8.0 K/UL  
 ABS. LYMPHOCYTES 2.1 0.8 - 3.5 K/UL  
 ABS. MONOCYTES 0.7 0.0 - 1.0 K/UL  
 ABS. EOSINOPHILS 0.2 0.0 - 0.4 K/UL  
 ABS. BASOPHILS 0.0 0.0 - 0.1 K/UL  
 ABS. IMM. GRANS. 0.1 (H) 0.00 - 0.04 K/UL  
 DF AUTOMATED

## 2019-01-14 VITALS
SYSTOLIC BLOOD PRESSURE: 114 MMHG | TEMPERATURE: 98.4 F | BODY MASS INDEX: 46.61 KG/M2 | HEART RATE: 72 BPM | RESPIRATION RATE: 16 BRPM | WEIGHT: 290 LBS | DIASTOLIC BLOOD PRESSURE: 57 MMHG | HEIGHT: 66 IN

## 2019-01-14 PROCEDURE — 74011250637 HC RX REV CODE- 250/637: Performed by: OBSTETRICS & GYNECOLOGY

## 2019-01-14 RX ORDER — IBUPROFEN 600 MG/1
600 TABLET ORAL
Qty: 30 TAB | Refills: 0 | Status: SHIPPED | OUTPATIENT
Start: 2019-01-14 | End: 2020-11-05

## 2019-01-14 RX ADMIN — IBUPROFEN 600 MG: 600 TABLET ORAL at 09:09

## 2019-01-14 RX ADMIN — IBUPROFEN 600 MG: 600 TABLET ORAL at 02:53

## 2019-01-14 RX ADMIN — MUPIROCIN: 20 OINTMENT TOPICAL at 11:24

## 2019-01-14 NOTE — PROGRESS NOTES
Post-Partum Day Number 2 Progress Note Patient doing well post-partum without significant complaints. Voiding without difficulty, normal lochia. Vitals:   
Patient Vitals for the past 24 hrs: 
 BP Temp Pulse Resp  
19 0549 114/57 98.4 °F (36.9 °C) 72 16  
19 2200 109/60 98.2 °F (36.8 °C) 80 18  
19 1520 137/90 98.5 °F (36.9 °C) 95 20 Temp (24hrs), Av.4 °F (36.9 °C), Min:98.2 °F (36.8 °C), Max:98.5 °F (36.9 °C) Vital signs stable, afebrile. Exam:  
  breast 
  Patient without distress. Abdomen soft, fundus firm, nontender, pannus Lower extremities- nontender without edema; no cords Labs: No results found for this or any previous visit (from the past 24 hour(s)). Assessment and Plan:  Patient appears to be having uncomplicated post-partum course. Discharge today-instructions reviewed. B positive Rx motrin

## 2019-01-14 NOTE — PROGRESS NOTES
Bedside shift change report given to NELLIE Timmons RN (oncoming nurse) by Franklyn Louis RN (offgoing nurse). Report included the following information SBAR, Intake/Output, MAR, Recent Results and Med Rec Status.

## 2019-01-14 NOTE — LACTATION NOTE
Problem: Lactation Care Plan Goal: *Infant latching appropriately Outcome: Resolved/Met Date Met: 01/14/19 Baby nursing well. Discussed eating a healthy diet. Instructed mother to eat a variety of foods in order to get a well balanced diet. She should consume an extra 300-500 calories per day (more than her non-pregnant requirement.) These extra calories will help provide energy needed for optimal breast milk production. Mother also encouraged to \"drink to thirst\" and it is recommended that she drink fluids such as water and fruit/vegetable juice. Nutritious snacks should be available so that she can eat throughout the day to help satisfy her hunger and maintain a good milk supply. Continue taking your prenatal vitamins as long as you breast feed.  
  
Goal: *Weight loss less than 10% of birth weight Outcome: Resolved/Met Date Met: 01/14/19 Encouraged mom to attempt feeding with baby led feeding cues. Just as sucking on fingers, rooting, mouthing. Looking for 8-12 feedings in 24 hours. Don't limit baby at breast, allow baby to come of breast on it's own. Baby may want to feed  often and may increase number of feedings on second day of life. Skin to skin encouraged.  
  
If baby doesn't nurse,  Mom should  hand express  10-20 drops of colostrum and drip into baby's mouth, or give to baby by finger feeding, cup feeding, or spoon feeding at least every 2-3 hours. Mom may  Pump and give infant any expressed milk. If not pumping any milk, mom should contact pediatrician for possible need for supplementation.  
  
  
Problem: Patient Education: Go to Patient Education Activity Goal: Patient/Family Education Outcome: Resolved/Met Date Met: 01/14/19 Breast Feeding Discharge Information 
  
Chart shows numerous feedings, void, stool WNL. Discussed Importance of monitoring outputs and feedings on first week of  Breastfeeding.   Discussed ways to tell if baby getting enough, ie  Voids and stools, by day 7, baby should have at least  4-6 wet diapers a day, change in color of stool to a seedy yellow, and return to birth wt within 2 weeks with a steady increase after that. .  Follow up with pediatrician visit for weight check in 1-2 days reviewed.   
  
Discussed Breast feeding support groups and encouraged to call Warm line number, [phone]  for any breast feeding questions or problems that arise. Please leave a message and let us know what is going on. We will return your call within 24 hours.  
  
Feedings Encouraged mom to attempt feeding with baby led feeding cues. Just as sucking on fingers, rooting, mouthing. Looking for 8-12 feedings in 24 hours. Don't limit baby at breast, allow baby to come of breast on it's own. Baby may want to feed  often and may increase number of feedings on second day of life. Skin to skin encouraged. In 4-6 weeks, baby may go though a growth spurt and increase feedings for several days to increase your milk supply.  
  
 If baby doesn't nurse,  Mom should Pump and give infant any expressed milk. If not pumping any milk, mom should contact pediatrician for possible need for supplementation. 
  
  
Engorgement Care Guidelines:  Anticipatory guidance shared. Reviewed how milk is made and normal phases of milk production. Taught care of engorged breasts  and how to help decrease engorgement. If mom should experience engorged breast, frequent breastfeeding encouraged, cool packs around breast and motrin or Ibuprofen as ordered by your Doctor. 
  
  
Call your doctor, midwife and/or lactation consultant if: · Baby is having no wet or dirty diapers · Baby has dark colored urine after day 3 
(should be pale yellow to clear) · Baby has dark colored stools after day 4 (should be mustard yellow, with no meconium) · Baby has fewer wet/soiled diapers or nurses less  
frequently than the goals listed here · Mom has symptoms of mastitis (sore breast with fever, chills, flu-like aching)

## 2019-01-14 NOTE — ROUTINE PROCESS
I have reviewed discharge instructions with the patient and spouse. The patient verbalized understanding. 1 Prescription given. Follow- up with OB in 6 weeks.

## 2019-01-14 NOTE — ROUTINE PROCESS
Bedside and Verbal shift change report given to Florencia Babb RN (oncoming nurse) by Spenser Sifuentes RN (offgoing nurse). Report included the following information SBAR, Kardex, Intake/Output, MAR and Accordion.

## 2020-01-02 ENCOUNTER — OFFICE VISIT (OUTPATIENT)
Dept: SURGERY | Age: 30
End: 2020-01-02

## 2020-01-02 VITALS
TEMPERATURE: 98.5 F | OXYGEN SATURATION: 98 % | BODY MASS INDEX: 47.09 KG/M2 | RESPIRATION RATE: 16 BRPM | SYSTOLIC BLOOD PRESSURE: 134 MMHG | HEIGHT: 66 IN | DIASTOLIC BLOOD PRESSURE: 88 MMHG | WEIGHT: 293 LBS | HEART RATE: 90 BPM

## 2020-01-02 DIAGNOSIS — E66.01 MORBID OBESITY (HCC): Primary | ICD-10-CM

## 2020-01-02 NOTE — PATIENT INSTRUCTIONS
Learning About Bariatric Surgery  What is bariatric surgery? Bariatric surgery is surgery to help you lose weight. This type of surgery is only used for people who are very overweight and have not been able to lose weight with diet and exercise. This surgery makes the stomach smaller. Some types of surgery also change the connection between your stomach and intestines. How is bariatric surgery done? Bariatric surgery may be either \"open\" or \"laparoscopic. \" Open surgery is done through a large cut (incision) in the belly. Laparoscopic surgery is done through several small cuts. The doctor puts a lighted tube, or scope, and other surgical tools through small cuts in your belly. The doctor is able to see your organs with the scope. There are different types of bariatric surgery. Gastric sleeve surgery  The surgery is usually done through several small incisions in the belly. The doctor removes more than half of your stomach. This leaves a thin sleeve, or tube, that is about the size of a banana. Because part of your stomach has been removed, this can't be reversed. Cherri-en-Y gastric bypass surgery  Cherri-en-Y (say \"kristin-en-why\") surgery changes the connection between the stomach and the intestines. The doctor separates a section of your stomach from the rest of your stomach. This makes a small pouch. The new pouch will hold the food you eat. The doctor connects the stomach pouch to the middle part of the small intestine. Gastric banding surgery  The surgery is usually done through several small incisions in the belly. The doctor wraps a band around the upper part of the stomach. This creates a small pouch. The small size of the pouch means that you will get full after you eat just a small amount of food. The doctor can inflate or deflate the band to adjust the size. This lets the doctor adjust how quickly food passes from the new pouch into the stomach.  It does not change the connection between the stomach and the intestines. What can you expect after the surgery? You may stay in the hospital for one or more days after the surgery. How long you stay depends on the type of surgery you had. Most people need 2 to 4 weeks before they are ready to get back to their usual routine. For the first 2 to 6 weeks after surgery, you probably will need to follow a liquid or soft diet. Bit by bit, you will be able to eat more solid foods. Your doctor may advise you to work with a dietitian. This way you'll be sure to get enough protein, vitamins, and minerals while you are losing weight. Even with a healthy diet, you may need to take vitamin and mineral supplements. After surgery, you will not be able to eat very much at one time. You will get full quickly. Try not to eat too much at one time or eat foods that are high in fat or sugar. If you do, you may vomit, get stomach pain, or have diarrhea. You probably will lose weight very quickly in the first few months after surgery. As time goes on, your weight loss will slow down. You will have regular doctor visits to check how you are doing. Think of bariatric surgery as a tool to help you lose weight. It isn't an instant fix. You will still need to eat a healthy diet and get regular exercise. This will help you reach your weight goal and avoid regaining the weight you lose. Follow-up care is a key part of your treatment and safety. Be sure to make and go to all appointments, and call your doctor if you are having problems. It's also a good idea to know your test results and keep a list of the medicines you take. Where can you learn more? Go to http://narcisa-kwame.info/. Enter G469 in the search box to learn more about \"Learning About Bariatric Surgery. \"  Current as of: March 28, 2019  Content Version: 12.2  © 0930-1883 [employer].  Care instructions adapted under license by [employer] (which disclaims liability or warranty for this information). If you have questions about a medical condition or this instruction, always ask your healthcare professional. [place] [address] any warranty or liability for your use of this information.

## 2020-01-02 NOTE — PROGRESS NOTES
Suraj Nash is new patient presents today for evaluation for weight loss surgery. Patient has been overweight since 13years old. Her weight has ranged from 219-319 lbs for the past 5 years. Her heaviest weight is 319 lbs. Suraj Nash  Body composition    female  34 y.o. Vitals:    01/02/20 1044   BP: 134/88   Pulse: 90   Resp: 16   Temp: 98.5 °F (36.9 °C)   TempSrc: Oral   SpO2: 98%   Weight: 319 lb (144.7 kg)   Height: 5' 6\" (1.676 m)     Body mass index is 51.49 kg/m². Harshad Plmarimar Neck- 14in  Waist-54in  Hips-58in    Dietary Habits:  Breakfast- breakfast sandwich  Lunch- leftovers-  Dinner- chicken, rice, fish, shrimp and veg  Snack- not a snacker, cookie, or 1/2 bag of M&M. Liquids- water, coffee and tea. Juice (16 oz). Prior weight loss attempts: Weight watchers, prescription diet pills and unsupervised diet. The Synetiq worked the best. She lost 2-3 lbs. Patient has an advanced directive:  Not on file. Ms. Corrinne Reels has a reminder for a \"due or due soon\" health maintenance. I have asked that she contact her primary care provider for follow-up on this health maintenance. Patient Active Problem List   Diagnosis Code    Pregnancy Z34.90    Obesity, morbid ([facility] [address] E66.01       Past Medical History:   Diagnosis Date    Abnormal Papanicolaou smear of cervix     Gestational hypertension     previous pregnancy         No past surgical history on file. Aleisha Robert NP      No Known Allergies      No family history on file.     Social History     Socioeconomic History    Marital status:      Spouse name: Not on file    Number of children: Not on file    Years of education: Not on file    Highest education level: Not on file   Occupational History    Not on file   Social Needs    Financial resource strain: Not on file    Food insecurity:     Worry: Not on file     Inability: Not on file    Transportation needs:     Medical: Not on file     Non-medical: Not on file Tobacco Use    Smoking status: Never Smoker    Smokeless tobacco: Never Used   Substance and Sexual Activity    Alcohol use: No     Frequency: Never    Drug use: No    Sexual activity: Yes     Partners: Male     Birth control/protection: None   Lifestyle    Physical activity:     Days per week: Not on file     Minutes per session: Not on file    Stress: Not on file   Relationships    Social connections:     Talks on phone: Not on file     Gets together: Not on file     Attends Yazidi service: Not on file     Active member of club or organization: Not on file     Attends meetings of clubs or organizations: Not on file     Relationship status: Not on file    Intimate partner violence:     Fear of current or ex partner: Not on file     Emotionally abused: Not on file     Physically abused: Not on file     Forced sexual activity: Not on file   Other Topics Concern     Service Not Asked    Blood Transfusions Not Asked    Caffeine Concern Not Asked    Occupational Exposure Not Asked   Kirsten Ronan Hazards Not Asked    Sleep Concern Not Asked    Stress Concern Not Asked    Weight Concern Not Asked    Special Diet Not Asked    Back Care Not Asked    Exercise Not Asked    Bike Helmet Not Asked   [address] Not Asked    Self-Exams Not Asked   Social History Narrative    Not on file     HPI    Review of Systems   Constitutional: Negative for chills, fever and malaise/fatigue. HENT: Negative for congestion, hearing loss, sinus pain, sore throat and tinnitus. Eyes: Negative for blurred vision, double vision, pain, discharge and redness. Respiratory: Negative for cough, sputum production, shortness of breath and wheezing. Cardiovascular: Negative for chest pain, palpitations and leg swelling. Gastrointestinal: Negative for abdominal pain, constipation, diarrhea, heartburn, nausea and vomiting. Genitourinary: Negative for dysuria, frequency and urgency.    Musculoskeletal: Negative for back pain, joint pain and neck pain. Skin: Negative for itching and rash. Neurological: Negative for dizziness, seizures and headaches. Psychiatric/Behavioral: Negative for depression. The patient is not nervous/anxious and does not have insomnia. Physical Exam  Constitutional:       Appearance: She is well-developed. Cardiovascular:      Rate and Rhythm: Normal rate and regular rhythm. Heart sounds: No murmur. No friction rub. No gallop. Pulmonary:      Effort: Pulmonary effort is normal.      Breath sounds: Normal breath sounds. Abdominal:      General: Bowel sounds are normal. There is no distension. Palpations: Abdomen is soft. Tenderness: There is no tenderness. Comments: No masses or hernia noted. Skin:     General: Skin is warm and dry. Neurological:      Mental Status: She is alert and oriented to person, place, and time. ASSESSMENT and PLAN      Sleeve gastrectomy or vertical gastric resection involves a resection of the vast majority of the stomach usually done with a dilator pressed against the right half of the stomach of the lesser curvature. One preserves the pyloric sphincter at the lower end of the stomach and then sequentially staples and divides all the way up to the gastroesophageal junction leaving a small rim of the stomach to the left better known as the angle of His. This procedure significantly reduces the amount that the stomach can hold while removing the part of the stomach that secretes the hormone grehlin and alters the speed of food emptying from the stomach. Once food leaves the stomach, the remainder of the intestinal tract is completely intact and there is no effect on the digestion or absorption of food nutrients and calories.  The procedure is intermediate between the adjustable gastric band and the gastric bypass as far as weight loss, potential complications and resolution of comorbid diseases such as Type 2 diabetes, high blood pressure, sleep apnea, arthritic pain, cholesterol disorders to mention a few. The usual complications are that of any procedure which affects the ability of the stomach to accept food at any given time. Those are usually nausea and vomiting which either spontaneously resolve or require endoscopic dilatation. The most serious complication from this surgery is a leak from the staple line usually near the  gastroesophageal junction. The frequency of this serious complication is low and usually heals spontaneously although reoperation may be necessary. The other serious complications are those which can occur with any major surgery and include  Infection, bleeding, blood clots and/or cardiopulmonary problems. Patient meets criteria established by the NIH. Without weight reduction, co-morbidities will escalate as well as risk of early mortality. Recommendation is patient could be served with surgical weight reduction, the procedure of  Sleeve Gastrectomy. I explained to the patient differences between laparoscopic and open gastric bypass, laparoscopic adjustable gastric banding, and sleeve gastrectomy procedures. Patient has attended one our informational meeting and has seen our educational materials. Patient desires to have surgery with Dr. Paulino Emery. I have reinforced without lifestyle change and behavior modification, they would not achieve his weight loss goals. I reviewed risks and complications associated with each procedure. She will need an UGI. Other recommendations include psychological evaluation, nutritional consultation. I discussed with patient a diet high in protein, low-fat, low- sugar, limited carbohydrates, and discontinuing use of carbonated beverages. Also discussed physical activity and exercises. I have answered all questions, they wish to proceed. 30 minutes spent face to face with patient, 50% of time spent counseling.      ** Copy to PCP and other providers

## 2020-01-13 ENCOUNTER — CLINICAL SUPPORT (OUTPATIENT)
Dept: SURGERY | Age: 30
End: 2020-01-13

## 2020-01-13 VITALS — BODY MASS INDEX: 52.29 KG/M2 | WEIGHT: 293 LBS

## 2020-01-13 DIAGNOSIS — E66.01 MORBID OBESITY (HCC): Primary | ICD-10-CM

## 2020-01-13 NOTE — PROGRESS NOTES
Pre-operative Bariatric Nutrition Evaluation ()     Date: 2020   Cary Raya M.D. Name: Shoshana Middleton  :  1990  Age:  34  Gender: Female   Type of Surgery: []           Gastric Bypass   [x]           Sleeve Gastrectomy    ASSESSMENT:    Past Medical History:none reported     Medications/Supplements:   Prior to Admission medications    Medication Sig Start Date End Date Taking? Authorizing Provider   ibuprofen (MOTRIN) 600 mg tablet Take 1 Tab by mouth every six (6) hours as needed. 19   Izabella East MD   PNV No12-Iron-FA-DSS-OM-3 29 mg iron-1 mg -50 mg CPKD Take  by mouth. Provider, Historical       Food Allergies/Intolerances:none    Anthropometrics:    Ht:66\"   Wt: 324#    IBW: 130#    %IBW: 249%     BMI:52    Category: obesity III     Reported wt history: Pt presents today for pre-op nutrition evaluation for wt loss surgery. Reports lowest adult BW of 210# at age 25 and highest adult BW of 324# at present. Attributes wt gain over the years r/t pregnancy, emotional eating, work schedule. Has attempted wt loss through various methods with most successful wt loss of 5-10# . Has been unable to maintain long term or significant wt loss and is now seeking approval for weight loss surgery. Pt will need to complete 6 months of supervised weight loss for insurance requirements. Exercise/Physical Activity:walking for 30 minutes, 4-5 times per week    Reported Diet History: diet, keto diet, fasting    24 Hour Diet Recall  Breakfast  Skips or bagel, muffin or madrigal/egg/cheese    Lunch  Soup or sometimes skips    Dinner  Meat, starch, veggies   Snacks  Tea biscuits, cookies, \"anything that's available at work\" candy, chips, chocolate, muffins    Beverages  Coffee, green tea, water, juice; soda once per week       Environment/Psychosocial/Support:pt lists her , mother and twin sister as main support system and rates level of support a 9 out of 10.  Pt works full time (3:30 am - 11:00 am) as a  for [employer]. Her  travels often for work. Pt's mother recently moved in with them to be able to help with their 3 children ages 10, 2 and 3. Reports an average of 5 hours sleep per night and often tries to nap when she gets home from work. Pt's twin sister had sleeve gastrectomy about 2 years ago in [place] and has maintained a 90# wt loss. NUTRITION DIAGNOSIS:  1. Self-monitoring deficit r/t work schedule evidenced by pt skips meals and lacks routine meal times d/t work and sleep schedules. Skipping meals often influences snack choices and portion sizes at the next meal.   2. Excessive energy intake r/t food and beverage choices evidenced by diet recall. 3. Food and nutrition related knowledge deficit r/t lack of prior exposure to information evidenced by pt seeking nutrition education for sleeve gastrectomy. NUTRITION INTERVENTION:  Pt educated on nutrition recommendations for weight loss surgery, specifically sleeve gastrectomy. Instructed on consuming 3 meals per day starting now. Use the balanced plate method to plan meals, include 3 oz of lean source of protein, 1/2 cup whole grains, unlimited non-starchy vegetables, 1/2 cup fruit and 1 serving of low fat dairy. Utilize handouts listing healthy snack and meal ideas to limit restaurant meals. After surgery measure all meals to 1/2 cup. Each meal will contain a 1/4 cup lean protein and 1/4 cup fruit, non-starchy vegetable or starch (limiting to once per day). Aim for 60 g protein per day. Sip on 48-64 oz of sugar free, calorie free, non-carbonated beverages each day. Do not use a straw. Do not consume beverages 30 minutes before, during or 30 minutes after meals. Read all nutrition labels. Demonstrated and emphasized identifying serving size, total fat, sugar and protein content. Defined low fat as </= 3 g per serving. Discussed lean and extra lean sources of protein.  Provided list of low fat cooking methods. Avoid foods with sugar listed in the first 3 ingredients and >/15 g sugar per serving. Excess sugar/fat intake may lead to dumping syndrome. Discussed signs and symptoms of dumping syndrome. Practice mindful eating habits; take small bites, chew thoroughly, avoid distractions, utilize hunger/fullness scale. Consume meals over 20-30 minutes. Attend Bariatric Support Group and increase physical activity (approved per MD) for long term weight maintenance. NUTRITION MONITORING AND EVALUATION:    The following goals were established with patient;  1. Eat 3 meals a day and work on developing a more routine eating schedule so as not to skip meals. Can use protein shakes PRN. 2. Improve snack choices to include more planned/packed items from home rather than reliance on vending machine/convenience at work. Lists of healthy snack ideas provided. Use protein shakes PRN. Include a protein with each snack to ensure satiety. 3. Use balanced plate method for help with meal planning and portion control. 4. Continue with walking regimen as tolerated. 5. Review nutrition guidelines provided today. Follow up next month for continued nutrition education and supervised weight loss. Specific tips and techniques to facilitate compliance with above recommendations were provided and discussed. Nutrition evaluation reveals important lifestyle changes are indicated. Goals set and recommendations made. Will continue to assess as pt works to complete supervised weight loss requirements. If further details are desired please feel free to contact me at [phone]. This phone number was also provided to the patient for any further questions or concerns.            Candis Claude, RD

## 2020-01-15 ENCOUNTER — HOSPITAL ENCOUNTER (OUTPATIENT)
Dept: GENERAL RADIOLOGY | Age: 30
Discharge: HOME | End: 2020-01-15
Attending: NURSE PRACTITIONER
Payer: OTHER GOVERNMENT

## 2020-01-15 DIAGNOSIS — E66.01 MORBID OBESITY (HCC): ICD-10-CM

## 2020-01-15 PROCEDURE — 74240 X-RAY XM UPR GI TRC 1CNTRST: CPT

## 2020-02-11 ENCOUNTER — CLINICAL SUPPORT (OUTPATIENT)
Dept: SURGERY | Age: 30
End: 2020-02-11

## 2020-02-12 VITALS — WEIGHT: 293 LBS | BODY MASS INDEX: 52.94 KG/M2

## 2020-03-10 ENCOUNTER — CLINICAL SUPPORT (OUTPATIENT)
Dept: SURGERY | Age: 30
End: 2020-03-10

## 2020-03-10 VITALS — WEIGHT: 293 LBS | BODY MASS INDEX: 52.46 KG/M2

## 2020-03-10 NOTE — PROGRESS NOTES
[facility] at [facility]  Supervised Weight Loss     Date:   3/10/2020    Patient's Name: Suraj Nash  : 1990    Insurance:  [employer]          Session: 3 of  6  Surgery: Sleeve Gastrectomy  Surgeon:  Jonathan Fritz MD    Height: 66\" Weight:    325      Lbs. BMI: 52.5   Pounds Lost since last month: 3               Pounds Gained since last month: 0    Starting Weight: 324#   Previous Months Weight: 328#  Overall Pounds Lost: 0 Overall Pounds Gained: 1    Other Pertinent Information: n/a    Smoking Status:  none  Alcohol Intake: 2 drinks at holidays    I have reviewed with pt the guidelines of the supervised wt loss program.  Pt understands the expectations of some wt loss during the program and that wt gain could delay the process. I have also explained that appointments need to be consecutive and missing an appointment may result in starting over. Pt has received this information in writing. Changes that patient has made since last month include:  Drinking 30 minutes before and after meals, less snacking, Drinking more water and more exercise. Eating Habits and Behaviors  General healthy eating guidelines were discussed. A nutrition lesson was presented on portion control. Patients were instructed implement portion control now using the balanced plate method (1/2 plate non-starchy vegetables, 1/4 plate lean meat, and 1/4 plate whole grains and to include fruit and/or milk at meals or snack). We discussed measuring meals to 1/2 cup total per meal after surgery and appropriate portion progression long term. Patient's current diet habits include: Pt eats 2-3 meals per day, skipping occasionally. Pt snacks between meals at work on granola bars, cheese its, rice cakes, wheat thins. Carbohydrate rich foods like chips pretzels, crackers eaten ~every other day. Pt eats mixture of baked grilled, broiled and sometimes fried foods.  Eating out at restaurants 1 x a week. Pt trying to drink 1 gallon of water per day. Denies emotional eating. Reports biggest challenge to weight loss is food choices and snacking. Physical Activity/Exercise  We talked about the importance of increasing daily physical activity and beginning to develop an exercise regimen/routine. We talked about exercise as being an important part of long term weight loss after surgery. Comments:  During class, I discussed with patient the importance of getting into an exercise routine. Pt is currently walking 4 x per week for 30 minutes for activity. Pt has been encouraged to increase frequency and intensity as tolerated to promote weight loss. Behavior Modification       We talked about how to eat more mindfully. Tips and recommendations for how to make these changes were provided. Pt was encouraged to keep a food journal and record what they were taking in daily. Overall Assessment: Pt demonstrates small/appropriate lifestyle changes evidenced mostly by reported changes. Will continue to assess.     Patient-Set Goals:   1. Nutrition - Keep up with current changes, make better food choices. 2. Exercise - Continue walking/jogging around the track with  and kids. 3. Behavior - reduce stress, avoid social media negativity.      Roger Olmos, [address]  3/11/2020

## 2020-04-14 ENCOUNTER — CLINICAL SUPPORT (OUTPATIENT)
Dept: SURGERY | Age: 30
End: 2020-04-14

## 2020-04-14 DIAGNOSIS — E66.01 MORBID OBESITY (HCC): Primary | ICD-10-CM

## 2020-04-14 NOTE — PROGRESS NOTES
[facility] at [facility]  Supervised Weight Loss     Date:   2020    Patient's Name: Comer Curling  : 1990    Insurance:  [email or web address]          Session:   Surgery: Sleeve gastrectomy  Surgeon:  Dr. Casey Tarango    Height: 66' Weight:  325 Lbs. BMI: 52.5   Pounds Lost since last month: 0              Pounds Gained since last month: 0    Starting Weight: 324#   Previous Months Weight: 325#  Overall Pounds Lost: 0 Overall Pounds Gained: 1    Other Pertinent Information: n/a    Smoking Status: none  Alcohol Intake: 1 every week    I have reviewed with pt the guidelines of the supervised wt loss program.  Pt understands the expectations of some wt loss during the program and that wt gain could delay the process. I have also explained that appointments need to be consecutive and missing an appointment may result in starting over. Pt has received this information in writing. Changes that patient has made since last month include: less snacking, drinking more water, \"trying to stay busy\". Eating Habits and Behaviors  General healthy eating guidelines were also discussed. Pts were instructed that their plate should be made up 1/2 plate coming from non-starchy vegetables, 1/4 coming from lean meat, and 1/4 of their plate coming from carbohydrates, including fruits, starches, or milk. We discussed measuring meals to 1/2 cup total per meal after surgery. Drinking only calorie-free, sugar-free and non-carbonated beverages. We discussed the importance of drinking 64 ounces of fluid per day to prevent dehydration post-operatively. Patient's current diet habits include: eating 3 meals a day (baked, grilled and broiled), snacking between lunch and dinner (in front of TV) on grapes, rice cakes and biscoff cookies; eating sweets 2x week. No eating out; no emotional eating noted. Biggest trigger when managing weight is food choices. Physical Activity/Exercise  An exercise presentation was provided including information about exercise programs available both before and after surgery. We talked about the importance of increasing daily physical activity and beginning to develop an exercise regimen/routine. We talked about exercise as being an important part of long term weight loss after surgery. Comments:  During class, I discussed with patient the importance of getting into an exercise routine. Pt is currently walking for 45 min, 3x week for activity. Pt has been encouraged to increase frequency. Behavior Modification       We talked about how to eat more mindfully. Tips and recommendations for how to make these changes were provided. Pt was encouraged to keep a food journal and record what they were taking in daily. Overall Assessment: Pt has small lifestyle/behavior changes over past month per pt report. Will continue to assess. Patient-Set Goals:   1. Nutrition - better food choices- pick fruit over \"junk\" food; buy more fruits/veggies  2. Exercise - continue current exercise routine- utilize back yard; walk with children  3.  Behavior - continue to pray and meditate; let anger and frustration go    Valencia King, [address] [place] [address]  4/14/2020

## 2020-05-19 ENCOUNTER — CLINICAL SUPPORT (OUTPATIENT)
Dept: SURGERY | Age: 30
End: 2020-05-19

## 2020-05-19 DIAGNOSIS — E66.01 MORBID OBESITY (HCC): Primary | ICD-10-CM

## 2020-05-20 NOTE — PROGRESS NOTES
[employer] [facility] at [facility]  Supervised Weight Loss     Date:   2020    Patient's Name: Ramos Chen  : 1990     Insurance:  [employer]          Session: 5 of  6  Surgery: Sleeve gastrectomy                      Surgeon:  Dr. Jaye Harris     Height: 77'      Reported Weight:  325 Lbs. BMI: 52.5          Pounds Lost since last month: 0              Pounds Gained since last month: 0     Starting Weight: 324#                       Previous Months Weight: 325#  Overall Pounds Lost: 0         Overall Pounds Gained: 1     Other Pertinent Information: Today's appointment was completed over the phone in accordance with social distancing guidelines by the [facility] due to COVID-19. Today's wt was self-reported by the patient.      Smoking Status: none  Alcohol Intake: 1 every week  I have reviewed with pt the guidelines of the supervised wt loss program.  Pt understands the expectations of some wt loss during the program and that wt gain could delay the process. I have also explained that appointments need to be consecutive and missing an appointment may result in starting over. Pt has received this information in writing. Changes that patient has made since last month include:  Drinking more water, eating more veggies, cut out fried foods. Eating Habits and Behaviors  A nutrition lesson was presented on label reading with specific guidelines provided for limiting added sugars. This information will help increase healthy food choices, promote weight loss and prevent dumping syndrome after gastric bypass. We also reviewed the general nutrition guidelines for bariatric surgery. Patient's current diet habits include: eating 3 meals a day. Snacking on rice cakes, biscoff cookies, apples, oranges. Eating refined carbohydrate foods daily. Eating sweets/desserts twice a week. Eating baked,grilled, broiled foods.  Eating out is 1-3 times per week. Drinking 86 oz fluid daily. Denies emotional eating. Reports food choices are biggest barrier to wt loss at this time. Physical Activity/Exercise  We talked about the importance of increasing daily physical activity and beginning to develop an exercise regimen/routine. We talked about exercise as being an important part of long term weight loss after surgery. Comments:  During class, I discussed with patient the importance of getting into an exercise routine. Pt is currently walking for 30 minutes, 3 times per week for activity. Pt has been encouraged to maintain and increase as tolerated. Behavior Modification       We talked about how to eat more mindfully. Tips and recommendations for how to make these changes were provided. Pt was encouraged to keep a food journal and record what they were taking in daily. Overall Assessment: Pt demonstrates appropriate lifestyle changes evidenced by reported changes. Will continue to assess. Patient-Set Goals:   1. Nutrition - add more veggies   2. Exercise - continue walking  3.  Behavior -continue aziza Phelan, KIT  5/20/2020

## 2020-06-16 ENCOUNTER — CLINICAL SUPPORT (OUTPATIENT)
Dept: SURGERY | Age: 30
End: 2020-06-16

## 2020-06-16 DIAGNOSIS — E66.01 MORBID OBESITY (HCC): Primary | ICD-10-CM

## 2020-07-16 NOTE — PROGRESS NOTES
[facility] at [facility]  Supervised Weight Loss     Date:   6/16/2020    Patient's Staci Barillas  QXV: [birth year]40[birth year]     Insurance:  [employer] [place]          Session: 9 SQ  6  Surgery: Sleeve gastrectomy                      Surgeon:  Dr. Mike Holland     Height: 66'      Reported Weight:  946 ERA.                               BMI: 52.5          Pounds Lost since last month: 3              Pounds Gained since last month: 0     Starting Weight: 324#                       Previous Months Weight: 325#  Overall Pounds Lost: 2                 Overall Pounds Gained: 0     Other Pertinent Information: Today's appointment was completed over the phone in accordance with social distancing guidelines by the [facility] due to COVID-19. Today's wt was self-reported by the patient.      Smoking Status: none  Alcohol Intake: 1 every week    I have reviewed with pt the guidelines of the supervised wt loss program.  Pt understands the expectations of some wt loss during the program and that wt gain could delay the process. I have also explained that classes need to be consecutive. Missing a class may result in starting over. Pt has received this information in writing. Changes that patient has made since last month include:  Eating more protein, no sodas, monitoring snacks. Eating Habits and Behaviors  General healthy eating guidelines were discussed. A nutrition lesson specific to the importance of protein intake after surgery was provided. We discussed food sources of protein, protein supplements and multiple reasons as to why protein is important after bariatric surgery. Pts were instructed to focus on including protein at every meal and practice eating protein first at the meal. Pts were encouraged to sample a protein shake for tolerance. Patients were also instructed to use the balanced plate method for help with portion control and general healthy eating prior to surgery.  We discussed measuring meals to 1/2 cup total per meal after surgery. Drinking only calorie-free, sugar-free and non-carbonated beverages. We discussed the importance of drinking 64 ounces of fluid per day to prevent dehydration post-operatively. Patient's current diet habits include: eating 3 meals a day. Snacking on crackers,cookies and fruit. Eating bread, rice, crackers and chips every other day. Eating cookies and cake 2 times per week. Eating a mixture of baked, grilled, broiled and some fried foods. Eating out is 2 times per week. Drinking 90 oz fluid daily. Denies emotional eating. Reports food choices are biggest barrier to wt loss. Physical Activity/Exercise  We talked about the importance of increasing daily physical activity and beginning to develop an exercise regimen/routine. We talked about exercise as being an important part of long term weight loss after surgery. Comments:  During class, I discussed with patient the importance of getting into an exercise routine. Pt is currently walking for 30 minutes, 3 times per week for activity. Pt has been encouraged to maintain and increase as tolerated. Behavior Modification       We talked about how to eat more mindfully. Tips and recommendations for how to make these changes were provided. Pt was encouraged to keep a food journal and record what they were taking in daily. Overall Assessment: Pt demonstrates appropriate lifestyle changes evidenced by reported changes and reported weight loss. Demonstrates good understanding of basic nutrition guidelines evidenced by participation in group discussion and appropriate questions asked. Appears to be an appropriate candidate at this time. Patient-Set Goals:   1. Nutrition - focus on protein in my diet   2. Exercise - continue walking and try jogging  3.  Behavior -be consistent with goals     Luis Cummings, [address]  6/16/2020 Location #1: left leg, left elbow, scalp

## 2020-09-09 ENCOUNTER — VIRTUAL VISIT (OUTPATIENT)
Dept: SURGERY | Age: 30
End: 2020-09-09
Payer: OTHER GOVERNMENT

## 2020-09-09 DIAGNOSIS — E66.01 MORBID OBESITY WITH BMI OF 50.0-59.9, ADULT (HCC): Primary | ICD-10-CM

## 2020-09-09 PROCEDURE — 99213 OFFICE O/P EST LOW 20 MIN: CPT | Performed by: SURGERY

## 2020-09-09 NOTE — PROGRESS NOTES
1. Have you been to the ER, urgent care clinic since your last visit? Hospitalized since your last visit? No    2. Have you seen or consulted any other health care providers outside of the [address] [place] [address] since your last visit? Include any pap smears or colon screening.  No

## 2020-09-09 NOTE — PROGRESS NOTES
Bariatric Surgery Consult    Warden Pryor is a 27 y.o. female with a history of morbid obesity. Her  ,  . There is no height or weight on file to calculate BMI. She reports that she has been trying to lose weight for 5 years. Her maximum weight was 325 pounds. She has attended our bariatric surgery information seminar. Yulissa Gipson wants to consider laparoscopic sleeve gastrectomy. Pt is referred by:  Vanessa Esteves NP. Comorbidities:     Bariatric comorbidities present: obstructive sleep apnea    Ambulatory status: independent    The patient's reported level of exercise: moderately active. Patient Active Problem List    Diagnosis Date Noted    Obesity, morbid ([facility] [address] 01/02/2020    Pregnancy 01/12/2019     Past Medical History:   Diagnosis Date    Abnormal Papanicolaou smear of cervix     Gestational hypertension     previous pregnancy      No past surgical history on file. Social History     Tobacco Use    Smoking status: Never Smoker    Smokeless tobacco: Never Used   Substance Use Topics    Alcohol use: No     Frequency: Never      No family history on file. .  Current Outpatient Medications   Medication Sig    ibuprofen (MOTRIN) 600 mg tablet Take 1 Tab by mouth every six (6) hours as needed.  PNV No12-Iron-FA-DSS-OM-3 29 mg iron-1 mg -50 mg CPKD Take  by mouth. No current facility-administered medications for this visit. No Known Allergies      Review of Systems:    Constitutional: negative  Ears, Nose, Mouth, Throat, and Face: negative  Respiratory: negative  Cardiovascular: negative  Gastrointestinal: negative  Genitourinary:negative  Integument/Breast: negative  Hematologic/Lymphatic: negative  Musculoskeletal:negative  Neurological: negative  Behavioral/Psychiatric: negative  Endocrine: negative  Allergic/Immunologic: negative    Objective: There were no vitals taken for this visit.      Physical Exam:    No physical exam due to virtual visit    UGI -  radiograph of the abdomen is unremarkable. The patient ingested barium for  the purpose of a single contrast upper GI. The esophagus is normal in course and  caliber without evidence of mass lesion or stricture. There is minimal reflux to  the level of the thoracic inlet. There is normal primary and secondary  peristalsis. The stomach is normal in appearance without evidence of mass  lesion. The stomach emptied readily into the duodenal bulb and sweep which are  normal.     IMPRESSION  IMPRESSION: Minimal reflux otherwise unremarkable. Assessment:     1. Morbid obesity (There is no height or weight on file to calculate BMI.) with multiple comorbidities. The patient meets criteria established by the NIH for weight loss surgery candidates. Without weight reduction, co-morbidities will escalate as well as increase risk of early mortality. Our recommendation is the patient could be served with laparoscopic sleeve gastrectomy. I explained to the patient differences between laparoscopic gastric bypass, laparoscopic adjustable gastric banding, and laparoscopic vertical sleeve gastrectomy with respect to expected weight loss, resolution of comorbidities and risks. Ms. Basia Perkins has attended one our informational meetings and has seen our educational materials. She has requested Dr. Cristy Leonard to perform her procedure. I reviewed the role for this procedure as a tool to help her achieve her weight loss goals. I reminded her that effective weight loss comes from lifelong adherence to changes in dietary choices, eating habits and exercise. Recommendation: We will request approval for laparoscopic sleeve gastrectomy. She is a good candidate for laparoscopic sleeve gastrectomy. Her upper GI was essentially normal with minimal reflux. She has minimal to no reflux generally. I was in the office while conducting this encounter.     Consent:  She and/or her healthcare decision maker is aware that this patient-initiated Telehealth encounter is a billable service, with coverage as determined by her insurance carrier. She is aware that she may receive a bill and has provided verbal consent to proceed: Yes    This virtual visit was conducted via D[employer]. me. Pursuant to the emergency declaration under the [facility], [facility] waiver authority and the [employer] Act, this Virtual  Visit was conducted to reduce the patient's risk of exposure to COVID-19 and provide continuity of care for an established patient. Services were provided through a video synchronous discussion virtually to substitute for in-person clinic visit. Due to this being a TeleHealth evaluation, many elements of the physical examination are unable to be assessed. Total Time: minutes: 21-30 minutes. Signed By: Tiffany Burkitt, MD     September 9, 2020       Greater than half of the time: 30 minutes was used in counciling the patient about bariatric surgery and the steps she needs to take to move forward with her surgery. Ms. Duke Lam has a reminder for a \"due or due soon\" health maintenance. I have asked that she contact her primary care provider for follow-up on this health maintenance.

## 2020-10-06 ENCOUNTER — HOSPITAL ENCOUNTER (OUTPATIENT)
Dept: PREADMISSION TESTING | Age: 30
Discharge: HOME | End: 2020-10-06
Payer: OTHER GOVERNMENT

## 2020-10-06 ENCOUNTER — HOSPITAL ENCOUNTER (OUTPATIENT)
Dept: GENERAL RADIOLOGY | Age: 30
Discharge: HOME | End: 2020-10-06
Payer: OTHER GOVERNMENT

## 2020-10-06 VITALS
DIASTOLIC BLOOD PRESSURE: 76 MMHG | BODY MASS INDEX: 47.09 KG/M2 | HEIGHT: 66 IN | SYSTOLIC BLOOD PRESSURE: 116 MMHG | RESPIRATION RATE: 16 BRPM | HEART RATE: 71 BPM | WEIGHT: 293 LBS | TEMPERATURE: 98.6 F

## 2020-10-06 LAB
25(OH)D3 SERPL-MCNC: 17.5 NG/ML (ref 30–100)
ALBUMIN SERPL-MCNC: 3.4 G/DL (ref 3.5–5)
ALBUMIN/GLOB SERPL: 0.9 {RATIO} (ref 1.1–2.2)
ALP SERPL-CCNC: 94 U/L (ref 45–117)
ALT SERPL-CCNC: 21 U/L (ref 12–78)
AMORPH CRY URNS QL MICRO: ABNORMAL
ANION GAP SERPL CALC-SCNC: 5 MMOL/L (ref 5–15)
APPEARANCE UR: ABNORMAL
AST SERPL-CCNC: 14 U/L (ref 15–37)
ATRIAL RATE: 66 BPM
BACTERIA URNS QL MICRO: ABNORMAL /HPF
BASOPHILS # BLD: 0 K/UL (ref 0–0.1)
BASOPHILS NFR BLD: 0 % (ref 0–1)
BILIRUB SERPL-MCNC: 0.4 MG/DL (ref 0.2–1)
BILIRUB UR QL: NEGATIVE
BUN SERPL-MCNC: 8 MG/DL (ref 6–20)
BUN/CREAT SERPL: 11 (ref 12–20)
CALCIUM SERPL-MCNC: 8.9 MG/DL (ref 8.5–10.1)
CALCULATED P AXIS, ECG09: 25 DEGREES
CALCULATED R AXIS, ECG10: 2 DEGREES
CALCULATED T AXIS, ECG11: 18 DEGREES
CHLORIDE SERPL-SCNC: 105 MMOL/L (ref 97–108)
CO2 SERPL-SCNC: 28 MMOL/L (ref 21–32)
COLOR UR: ABNORMAL
CREAT SERPL-MCNC: 0.7 MG/DL (ref 0.55–1.02)
DIAGNOSIS, 93000: NORMAL
DIFFERENTIAL METHOD BLD: ABNORMAL
EOSINOPHIL # BLD: 0.3 K/UL (ref 0–0.4)
EOSINOPHIL NFR BLD: 2 % (ref 0–7)
EPITH CASTS URNS QL MICRO: ABNORMAL /LPF
ERYTHROCYTE [DISTWIDTH] IN BLOOD BY AUTOMATED COUNT: 13.8 % (ref 11.5–14.5)
GLOBULIN SER CALC-MCNC: 4 G/DL (ref 2–4)
GLUCOSE SERPL-MCNC: 85 MG/DL (ref 65–100)
GLUCOSE UR STRIP.AUTO-MCNC: NEGATIVE MG/DL
HCT VFR BLD AUTO: 41.7 % (ref 35–47)
HGB BLD-MCNC: 12.9 G/DL (ref 11.5–16)
HGB UR QL STRIP: NEGATIVE
IMM GRANULOCYTES # BLD AUTO: 0.1 K/UL (ref 0–0.04)
IMM GRANULOCYTES NFR BLD AUTO: 1 % (ref 0–0.5)
IRON SERPL-MCNC: 41 UG/DL (ref 35–150)
KETONES UR QL STRIP.AUTO: NEGATIVE MG/DL
LEUKOCYTE ESTERASE UR QL STRIP.AUTO: ABNORMAL
LYMPHOCYTES # BLD: 3 K/UL (ref 0.8–3.5)
LYMPHOCYTES NFR BLD: 29 % (ref 12–49)
MCH RBC QN AUTO: 26.6 PG (ref 26–34)
MCHC RBC AUTO-ENTMCNC: 30.9 G/DL (ref 30–36.5)
MCV RBC AUTO: 86 FL (ref 80–99)
MONOCYTES # BLD: 0.6 K/UL (ref 0–1)
MONOCYTES NFR BLD: 5 % (ref 5–13)
NEUTS SEG # BLD: 6.6 K/UL (ref 1.8–8)
NEUTS SEG NFR BLD: 62 % (ref 32–75)
NITRITE UR QL STRIP.AUTO: POSITIVE
NRBC # BLD: 0 K/UL (ref 0–0.01)
NRBC BLD-RTO: 0 PER 100 WBC
P-R INTERVAL, ECG05: 174 MS
PH UR STRIP: 6.5 [PH] (ref 5–8)
PLATELET # BLD AUTO: 288 K/UL (ref 150–400)
PMV BLD AUTO: 9.9 FL (ref 8.9–12.9)
POTASSIUM SERPL-SCNC: 3.9 MMOL/L (ref 3.5–5.1)
PROT SERPL-MCNC: 7.4 G/DL (ref 6.4–8.2)
PROT UR STRIP-MCNC: NEGATIVE MG/DL
Q-T INTERVAL, ECG07: 402 MS
QRS DURATION, ECG06: 82 MS
QTC CALCULATION (BEZET), ECG08: 421 MS
RBC # BLD AUTO: 4.85 M/UL (ref 3.8–5.2)
RBC #/AREA URNS HPF: ABNORMAL /HPF (ref 0–5)
SODIUM SERPL-SCNC: 138 MMOL/L (ref 136–145)
SP GR UR REFRACTOMETRY: 1.02 (ref 1–1.03)
TSH SERPL DL<=0.05 MIU/L-ACNC: 1.75 UIU/ML (ref 0.36–3.74)
UA: UC IF INDICATED,UAUC: ABNORMAL
UROBILINOGEN UR QL STRIP.AUTO: 1 EU/DL (ref 0.2–1)
VENTRICULAR RATE, ECG03: 66 BPM
WBC # BLD AUTO: 10.5 K/UL (ref 3.6–11)
WBC URNS QL MICRO: ABNORMAL /HPF (ref 0–4)

## 2020-10-06 PROCEDURE — 82306 VITAMIN D 25 HYDROXY: CPT

## 2020-10-06 PROCEDURE — 83540 ASSAY OF IRON: CPT

## 2020-10-06 PROCEDURE — 85025 COMPLETE CBC W/AUTO DIFF WBC: CPT

## 2020-10-06 PROCEDURE — 87086 URINE CULTURE/COLONY COUNT: CPT

## 2020-10-06 PROCEDURE — 80053 COMPREHEN METABOLIC PANEL: CPT

## 2020-10-06 PROCEDURE — 71046 X-RAY EXAM CHEST 2 VIEWS: CPT

## 2020-10-06 PROCEDURE — 93005 ELECTROCARDIOGRAM TRACING: CPT

## 2020-10-06 PROCEDURE — 84443 ASSAY THYROID STIM HORMONE: CPT

## 2020-10-06 PROCEDURE — 81001 URINALYSIS AUTO W/SCOPE: CPT

## 2020-10-06 PROCEDURE — 86677 HELICOBACTER PYLORI ANTIBODY: CPT

## 2020-10-06 PROCEDURE — 87077 CULTURE AEROBIC IDENTIFY: CPT

## 2020-10-06 PROCEDURE — 36415 COLL VENOUS BLD VENIPUNCTURE: CPT

## 2020-10-06 NOTE — PERIOP NOTES
DO NOT EAT ANYTHING AFTER MIDNIGHT, except as instructed THE NIGHT BEFORE SURGERY. PT GIVEN OPPORTUNITY TO ASK ADDITIONAL QUESTIONS. Patient given two bottles CHG soap and instruction sheet, instructions for use reviewed with patient. Patient given surgical site infection information FAQs handout and hand hygiene tips sheet. Pre-operative instructions reviewed and patient verbalizes understanding of instructions. Patient has been given the opportunity to ask additional questions. [facility] APPOINTMENTS REVIEWED AND GIVEN TO PATIENT, INCLUDING PHONE NUMBER TO REGISTRATION. COVID-19 INSTRUCTION SHEET ALSO REVIEWED AND GIVEN TO PATIENT. PT INSTRUCTED TO GO TO [facility] OUT-PATIENT FOR CXR TODAY.

## 2020-10-07 LAB
BACTERIA SPEC CULT: ABNORMAL
CC UR VC: ABNORMAL
SERVICE CMNT-IMP: ABNORMAL

## 2020-10-08 ENCOUNTER — TELEPHONE (OUTPATIENT)
Dept: SURGERY | Age: 30
End: 2020-10-08

## 2020-10-08 LAB — H PYLORI IGG SER IA-ACNC: 0.3 INDEX VALUE (ref 0–0.79)

## 2020-10-08 RX ORDER — NITROFURANTOIN 25; 75 MG/1; MG/1
100 CAPSULE ORAL 2 TIMES DAILY
Qty: 14 CAP | Refills: 0 | Status: SHIPPED | OUTPATIENT
Start: 2020-10-08 | End: 2020-10-15

## 2020-10-08 NOTE — PERIOP NOTES
SENT THE FOLLOWING MESSAGE VIA [facility] ON 10/8/2020 AT 1110 to ZOILA ROTHMAN, NURSE FOR DR. Raymon BOYD. Sergio Juliusjudy JL DAWKINS 89 CAME IN TO PRE ADMISSION TESTING ON 10/7/2020 AND HER URINE CULTURE CAME BACK > 100,000 COLONIES OF A AEROCOCCUS URINATE . A URINAE HAS BEEN DESCRIBED AS SUSCEPTIBLE TO PENICILLIN, AMOXICILLIN, PIPERACILLIN, CEPFIPIME, RIFAMPIN AND NITROFURANTOIN, BUT RESISTANT TO SULFONAMIDES. CAN YOU LET US KNOW IF DR. BOYD STARTS HER ON AN ANTIBIOTIC PRIOR TO SURGERY?        Mayra Cardenas RN  PRE ADMISSION TESTING [facility],  [address] [place] [address]

## 2020-10-09 NOTE — PERIOP NOTES
Иван Vaughn, [facility] FOR SURGEONS OFFICE STARTED PATIENT ON NITROFURANTION PRIOR TO SURGERY DUE TO HER ABNORMAL URINE CULTURE IN PRE ADMISSION TESTING.

## 2020-10-14 ENCOUNTER — OFFICE VISIT (OUTPATIENT)
Dept: SURGERY | Age: 30
End: 2020-10-14
Payer: OTHER GOVERNMENT

## 2020-10-14 VITALS
HEART RATE: 101 BPM | HEIGHT: 66 IN | BODY MASS INDEX: 47.09 KG/M2 | RESPIRATION RATE: 20 BRPM | TEMPERATURE: 99.5 F | OXYGEN SATURATION: 97 % | WEIGHT: 293 LBS | DIASTOLIC BLOOD PRESSURE: 87 MMHG | SYSTOLIC BLOOD PRESSURE: 140 MMHG

## 2020-10-14 DIAGNOSIS — E66.01 MORBID OBESITY (HCC): Primary | ICD-10-CM

## 2020-10-14 DIAGNOSIS — G89.18 POST-OP PAIN: ICD-10-CM

## 2020-10-14 PROCEDURE — 99024 POSTOP FOLLOW-UP VISIT: CPT | Performed by: SURGERY

## 2020-10-14 RX ORDER — HYOSCYAMINE SULFATE 0.12 MG/1
0.12 TABLET SUBLINGUAL
Qty: 30 TAB | Refills: 0 | Status: SHIPPED | OUTPATIENT
Start: 2020-10-14 | End: 2021-05-24

## 2020-10-14 RX ORDER — GABAPENTIN 100 MG/1
100-200 CAPSULE ORAL 3 TIMES DAILY
Qty: 30 CAP | Refills: 0 | Status: SHIPPED | OUTPATIENT
Start: 2020-10-14 | End: 2020-10-19

## 2020-10-14 RX ORDER — OMEPRAZOLE 20 MG/1
20 CAPSULE, DELAYED RELEASE ORAL DAILY
Qty: 90 CAP | Refills: 1 | Status: SHIPPED | OUTPATIENT
Start: 2020-10-14 | End: 2021-05-24 | Stop reason: SDUPTHER

## 2020-10-14 RX ORDER — ONDANSETRON 4 MG/1
4 TABLET, ORALLY DISINTEGRATING ORAL
Qty: 30 TAB | Refills: 0 | Status: SHIPPED | OUTPATIENT
Start: 2020-10-14 | End: 2021-05-24

## 2020-10-14 RX ORDER — ENOXAPARIN SODIUM 100 MG/ML
40 INJECTION SUBCUTANEOUS DAILY
Qty: 14 SYRINGE | Refills: 0 | Status: SHIPPED | OUTPATIENT
Start: 2020-11-03 | End: 2020-11-17

## 2020-10-14 RX ORDER — POLYETHYLENE GLYCOL 3350 17 G/17G
17 POWDER, FOR SOLUTION ORAL DAILY
Qty: 510 G | Refills: 0 | Status: SHIPPED | OUTPATIENT
Start: 2020-10-14 | End: 2020-11-13

## 2020-10-14 NOTE — PATIENT INSTRUCTIONS
Learning About How to Prepare for Weight-Loss (Bariatric) Surgery How can you prepare for weight-loss surgery? Having weight-loss surgery is a big step. You can prepare for surgery by having a plan. Your plan may include your goals for losing weight and how to makes changes in your diet, activity, and lifestyle to help raise your chances of success. One way to prepare for surgery is to think about your goal or reason why you want to reach a healthy weight. Do you want to lower your blood pressure, cholesterol, or blood sugar? Do you want to be able to sleep better, play with your kids, or walk around the block? Having a reason can help you stay with your plan and meet your goals. You'll have a weight loss team that you can talk to about your plans and goals. The team will help you get ready for surgery. After surgery, the team will help you adjust to new ways of eating and changes to your body. How will weight-loss surgery affect your life? You have likely thought a lot about how surgery may affect your lifehow you will eat, how your body will look, or how you will feel. Some people feel overwhelmed with these changes. These may include: A slimmer you. You probably will lose weight very quickly in the first few months after surgery. As time goes on, your weight loss will slow down. How much weight you lose depends on what type of surgery you had and how well your new eating and activity plans are working for you. A new way of eating. Success in reaching and keeping a healthy weight depends on making lifelong changes in how you eat. After surgery, you raise your chances of success if you: 
Eat just a few ounces of food at a time. Eat very slowly and chew your food to mush. Don't drink for 30 minutes before you eat, during your meal, and for 30 minutes after you eat. Are careful about drinking alcohol. Avoid foods that are high in fat or sugar. Take vitamin and mineral supplements. A healthier you. Weight-loss surgery can have some real health benefits. Problems like diabetes, high blood pressure, and sleep apnea may go awayor at least become easier to manage. A more active you. After surgery, being active on most days of the week will help you reach your weight goal and avoid gaining back the weight you lose. A lot of extra skin. When you lose weight quickly, you may have a lot of extra skin. That's normal. You can have surgery to remove the extra skin if it bothers you. There are going to be some ups and downs while you get used to these changes. So another way to adjust is to identify who can help support you. Getting support from friends and family can help. And joining a support group for people who have had the surgery can be a big help too, because they know what you're going through. Another way you can help yourself adjust to changes is to have a plan. When you have a plan, you can focus on losing weight and living a healthier life. So what steps can you take to prepare for weight-loss surgery? Will you set some goals? Will you learn about how surgery can affect your life? How about asking family or friends for help? Write out your plan. Then get ready. Where can you learn more? Go to http://www.gray.com/ Enter O505 in the search box to learn more about \"Learning About How to Prepare for Weight-Loss (Bariatric) Surgery. \" Current as of: December 11, 2019               Content Version: 12.6 © 1714-7185 [employer]. Care instructions adapted under license by [employer] (which disclaims liability or warranty for this information). If you have questions about a medical condition or this instruction, always ask your healthcare professional. [address] any warranty or liability for your use of this information.  
You will start the liver shrinking diet 2 weeks before surgery unless otherwise told by physician or NP. Follow your handbooks instructions for Liver shrinking diet. Enhanced Recovery after Surgery (ERAS) Take 1000mg of Tylenol with lunch and dinner the day before surgery. You may drink clear liquids up to 1 hours before surgery. Do NOT start medications prescribed until after surgery. COVID_19 testing Pre-Admission testing will be in touch with you in regards to COVID-19 testing. You will be required to be tested 96 hours prior to surgery. Failure to have test completed or a positive result will require rescheduling of your procedure. No visitors while you are in the hospital.  
 
Your first follow up visit will be a face to face visit. Your second and third follow up will be virtual.  
 
Diet after surgery until your 2 week follow up visit. Bariatric Full Liquids  2 weeks What is this diet? ? Easy to swallow liquids allow your stomach to heal after surgery ? Prevents dehydration ? Introduction of liquid meals (protein shakes) When do I begin? ? The morning after surgery ? Use 1 ounce (30 mL) cups ? Initial goal is to drink 4 ounces of fluid over 1 hour (3 oz. water + 1 oz. protein shake). The rate at which you drink should be controlled. Take a sip and evaluate how you feel before you continue to drink. ? Repeat every hour while awake ? Discharge Goal:  Consume & tolerate at least 4 ounces per hour for 4 hours ? Stay on liquids for 2 weeks at home What foods can I eat? 
? No sugar added, non-carbonated, non-caffeinated fluids ? Meal replacement shakes (2-3 per day), from the approved list 
? Strained, blenderized soup ? Limit juice to 4 ounces per day. Choose only 100% no sugar added fruit juice. Juice can be diluted with water. Key Points ? Sip, do not gulp ? Use 1 ounce medicine cups to control the rate ? Stop when full. If you feel fullness, pain or nausea stop sipping until the feeling passes ? Do not drink from a straw ? Fluid Goal:  48-64 ounces of liquids every day. 48 ounces per day should be from clear liquids. ? Sip, sip, sip throughout the day ? Main priority is to stay hydrated ? Aim for 60 grams of protein every day. Most of your protein will come from shakes. ? Add additional protein supplements to meet protein needs ? Limit caffeine ? Avoid alcohol ? Record the ounces of liquids and shakes consumed per day in the log provided ? Bring the log to your surgeon's appointments to monitor hydration and  protein intake

## 2020-10-14 NOTE — PROGRESS NOTES
ICD-10-CM ICD-9-CM    1. Morbid obesity ([facility] [address]  E66.01 278.01    2. BMI 50.0-59.9, adult ([facility]  Z68.43 V85.43    3. Post-op pain  G89.18 338.18        Plan:   1 & 2. Morbid Obesity- Patient is schedule for Sleeve gastrectomy with Luz Caballero on 11/3/2020 at [address] patient in regard to post diet restrictions, follow- up office visits, follow- up care. Patient as received educational booklet and vitamin list. Reviewed liver shrinking diet. Post op medications prescribed: zofran, Levsin, Miralax, lovenox, and Prilosec  Reviewed ERAS protocol: Take 1000mg of Tylenol with lunch and dinner the day before surgery. You may drink clear liquids up to 1 hours before surgery. Do NOT start medications prescribed until after surgery. Reviewed with patient that lifelong vitamin supplementation is recommended by provider as well as risks of non-compliance. 3. Post-op pain- Neurontin prescribed for post-op pain. Pt  verbalized understanding and questions were answered to the best of my knowledge and ability.             Signed By: Kate Meza NP     October 14, 2020

## 2020-10-14 NOTE — H&P (VIEW-ONLY)
ICD-10-CM ICD-9-CM 1. Morbid obesity ([facility] [address]  E66.01 278.01   
2. BMI 50.0-59.9, adult [facility])  Z68.43 V85.43 3. Post-op pain  G89.18 338.18 Plan:  
1 & 2. Morbid Obesity- Patient is schedule for Sleeve gastrectomy with sezmi  on 11/3/2020 at [address] patient in regard to post diet restrictions, follow- up office visits, follow- up care. Patient as received educational booklet and vitamin list. Reviewed liver shrinking diet. Post op medications prescribed: zofran, Levsin, Miralax, lovenox, and Prilosec Reviewed ERAS protocol: Take 1000mg of Tylenol with lunch and dinner the day before surgery. You may drink clear liquids up to 1 hours before surgery. Do NOT start medications prescribed until after surgery. Reviewed with patient that lifelong vitamin supplementation is recommended by provider as well as risks of non-compliance. 3. Post-op pain- Neurontin prescribed for post-op pain. Pt  verbalized understanding and questions were answered to the best of my knowledge and ability. Signed By: Padmini Kaufman NP October 14, 2020

## 2020-10-14 NOTE — PROGRESS NOTES
1. Have you been to the ER, urgent care clinic since your last visit? Hospitalized since your last visit? No     2. Have you seen or consulted any other health care providers outside of the [address] [place] [address] since your last visit? Include any pap smears or colon screening.  No

## 2020-10-30 ENCOUNTER — TRANSCRIBE ORDER (OUTPATIENT)
Dept: REGISTRATION | Age: 30
End: 2020-10-30

## 2020-10-30 ENCOUNTER — HOSPITAL ENCOUNTER (OUTPATIENT)
Dept: PREADMISSION TESTING | Age: 30
Discharge: HOME | End: 2020-10-30
Payer: OTHER GOVERNMENT

## 2020-10-30 DIAGNOSIS — Z01.812 PRE-PROCEDURE LAB EXAM: Primary | ICD-10-CM

## 2020-10-30 DIAGNOSIS — Z01.812 PRE-PROCEDURE LAB EXAM: ICD-10-CM

## 2020-10-30 PROCEDURE — 87635 SARS-COV-2 COVID-19 AMP PRB: CPT

## 2020-10-31 LAB — SARS-COV-2, COV2NT: NOT DETECTED

## 2020-11-02 ENCOUNTER — ANESTHESIA EVENT (OUTPATIENT)
Dept: SURGERY | Age: 30
DRG: 621 | End: 2020-11-02
Payer: OTHER GOVERNMENT

## 2020-11-03 ENCOUNTER — ANESTHESIA (OUTPATIENT)
Dept: SURGERY | Age: 30
DRG: 621 | End: 2020-11-03
Payer: OTHER GOVERNMENT

## 2020-11-03 ENCOUNTER — HOSPITAL ENCOUNTER (INPATIENT)
Age: 30
LOS: 2 days | Discharge: HOME | DRG: 621 | End: 2020-11-05
Attending: SURGERY | Admitting: SURGERY
Payer: OTHER GOVERNMENT

## 2020-11-03 DIAGNOSIS — E66.01 MORBID OBESITY WITH BMI OF 50.0-59.9, ADULT (HCC): ICD-10-CM

## 2020-11-03 LAB — HCG UR QL: NEGATIVE

## 2020-11-03 PROCEDURE — 43775 LAP SLEEVE GASTRECTOMY: CPT | Performed by: SURGERY

## 2020-11-03 PROCEDURE — 76010000153 HC OR TIME 1.5 TO 2 HR: Performed by: SURGERY

## 2020-11-03 PROCEDURE — 0DJ08ZZ INSPECTION OF UPPER INTESTINAL TRACT, VIA NATURAL OR ARTIFICIAL OPENING ENDOSCOPIC: ICD-10-PCS | Performed by: SURGERY

## 2020-11-03 PROCEDURE — 77030026438 HC STYL ET INTUB CARD -A: Performed by: ANESTHESIOLOGY

## 2020-11-03 PROCEDURE — 76210000006 HC OR PH I REC 0.5 TO 1 HR: Performed by: SURGERY

## 2020-11-03 PROCEDURE — 77030040922 HC BLNKT HYPOTHRM STRY -A

## 2020-11-03 PROCEDURE — 77030014090 HC TRCR OPT AMR -B: Performed by: SURGERY

## 2020-11-03 PROCEDURE — 77030038157 HC DEV PWR CNTR DISP SIGNIA COVD -C: Performed by: SURGERY

## 2020-11-03 PROCEDURE — P9045 ALBUMIN (HUMAN), 5%, 250 ML: HCPCS | Performed by: NURSE ANESTHETIST, CERTIFIED REGISTERED

## 2020-11-03 PROCEDURE — 77030008684 HC TU ET CUF COVD -B: Performed by: ANESTHESIOLOGY

## 2020-11-03 PROCEDURE — 77030037367 HC STPLR ENDO TRI-STPLR COVD -D: Performed by: SURGERY

## 2020-11-03 PROCEDURE — 77030040956 HC DSCTR SONICISION MEDT -I: Performed by: SURGERY

## 2020-11-03 PROCEDURE — 74011000250 HC RX REV CODE- 250: Performed by: NURSE ANESTHETIST, CERTIFIED REGISTERED

## 2020-11-03 PROCEDURE — 0DB64Z3 EXCISION OF STOMACH, PERCUTANEOUS ENDOSCOPIC APPROACH, VERTICAL: ICD-10-PCS | Performed by: SURGERY

## 2020-11-03 PROCEDURE — 77030031139 HC SUT VCRL2 J&J -A: Performed by: SURGERY

## 2020-11-03 PROCEDURE — 77030016151 HC PROTCTR LNS DFOG COVD -B: Performed by: SURGERY

## 2020-11-03 PROCEDURE — 77030019908 HC STETH ESOPH SIMS -A: Performed by: ANESTHESIOLOGY

## 2020-11-03 PROCEDURE — 88307 TISSUE EXAM BY PATHOLOGIST: CPT

## 2020-11-03 PROCEDURE — 77030002933 HC SUT MCRYL J&J -A: Performed by: SURGERY

## 2020-11-03 PROCEDURE — 74011000258 HC RX REV CODE- 258: Performed by: NURSE ANESTHETIST, CERTIFIED REGISTERED

## 2020-11-03 PROCEDURE — 74011000250 HC RX REV CODE- 250: Performed by: SURGERY

## 2020-11-03 PROCEDURE — 77030022704 HC SUT VLOC COVD -B: Performed by: SURGERY

## 2020-11-03 PROCEDURE — 77030020829: Performed by: SURGERY

## 2020-11-03 PROCEDURE — 77030037368 HC STPLR ENDO TRI-STPLR COVD -E: Performed by: SURGERY

## 2020-11-03 PROCEDURE — 74011250636 HC RX REV CODE- 250/636: Performed by: NURSE ANESTHETIST, CERTIFIED REGISTERED

## 2020-11-03 PROCEDURE — 81025 URINE PREGNANCY TEST: CPT

## 2020-11-03 PROCEDURE — 76060000035 HC ANESTHESIA 2 TO 2.5 HR: Performed by: SURGERY

## 2020-11-03 PROCEDURE — 74011250637 HC RX REV CODE- 250/637: Performed by: SURGERY

## 2020-11-03 PROCEDURE — 77030020263 HC SOL INJ SOD CL0.9% LFCR 1000ML: Performed by: SURGERY

## 2020-11-03 PROCEDURE — 77030040361 HC SLV COMPR DVT MDII -B: Performed by: SURGERY

## 2020-11-03 PROCEDURE — 77030008756 HC TU IRR SUC STRY -B: Performed by: SURGERY

## 2020-11-03 PROCEDURE — 77030002895 HC DEV VASC CLOSR COVD -B: Performed by: SURGERY

## 2020-11-03 PROCEDURE — 77030034208 HC SLV GASTRCTMY 3D CAL SYS DISP BOEH -C: Performed by: SURGERY

## 2020-11-03 PROCEDURE — 77030013079 HC BLNKT BAIR HGGR 3M -A: Performed by: ANESTHESIOLOGY

## 2020-11-03 PROCEDURE — 77030037032 HC INSRT SCIS CLICKLLINE DISP STOR -B: Performed by: SURGERY

## 2020-11-03 PROCEDURE — 74011250636 HC RX REV CODE- 250/636: Performed by: SURGERY

## 2020-11-03 PROCEDURE — 2709999900 HC NON-CHARGEABLE SUPPLY

## 2020-11-03 PROCEDURE — 65660000000 HC RM CCU STEPDOWN

## 2020-11-03 PROCEDURE — 2709999900 HC NON-CHARGEABLE SUPPLY: Performed by: SURGERY

## 2020-11-03 PROCEDURE — 77030012770 HC TRCR OPT FX AMR -B: Performed by: SURGERY

## 2020-11-03 PROCEDURE — 77030010507 HC ADH SKN DERMBND J&J -B: Performed by: SURGERY

## 2020-11-03 RX ORDER — SODIUM CHLORIDE 0.9 % (FLUSH) 0.9 %
5-40 SYRINGE (ML) INJECTION AS NEEDED
Status: CANCELLED | OUTPATIENT
Start: 2020-11-03

## 2020-11-03 RX ORDER — SODIUM CHLORIDE 9 MG/ML
50 INJECTION, SOLUTION INTRAVENOUS CONTINUOUS
Status: CANCELLED | OUTPATIENT
Start: 2020-11-03 | End: 2020-11-04

## 2020-11-03 RX ORDER — SODIUM CHLORIDE 0.9 % (FLUSH) 0.9 %
5-40 SYRINGE (ML) INJECTION EVERY 8 HOURS
Status: DISCONTINUED | OUTPATIENT
Start: 2020-11-03 | End: 2020-11-03 | Stop reason: HOSPADM

## 2020-11-03 RX ORDER — DEXMEDETOMIDINE HYDROCHLORIDE 100 UG/ML
INJECTION, SOLUTION INTRAVENOUS AS NEEDED
Status: DISCONTINUED | OUTPATIENT
Start: 2020-11-03 | End: 2020-11-03 | Stop reason: HOSPADM

## 2020-11-03 RX ORDER — SODIUM CHLORIDE, SODIUM LACTATE, POTASSIUM CHLORIDE, CALCIUM CHLORIDE 600; 310; 30; 20 MG/100ML; MG/100ML; MG/100ML; MG/100ML
125 INJECTION, SOLUTION INTRAVENOUS CONTINUOUS
Status: DISCONTINUED | OUTPATIENT
Start: 2020-11-03 | End: 2020-11-05 | Stop reason: HOSPADM

## 2020-11-03 RX ORDER — MIDAZOLAM HYDROCHLORIDE 1 MG/ML
0.5 INJECTION, SOLUTION INTRAMUSCULAR; INTRAVENOUS
Status: DISCONTINUED | OUTPATIENT
Start: 2020-11-03 | End: 2020-11-03 | Stop reason: HOSPADM

## 2020-11-03 RX ORDER — ONDANSETRON 2 MG/ML
4 INJECTION INTRAMUSCULAR; INTRAVENOUS AS NEEDED
Status: DISCONTINUED | OUTPATIENT
Start: 2020-11-03 | End: 2020-11-03 | Stop reason: HOSPADM

## 2020-11-03 RX ORDER — SODIUM CHLORIDE 0.9 % (FLUSH) 0.9 %
5-40 SYRINGE (ML) INJECTION AS NEEDED
Status: DISCONTINUED | OUTPATIENT
Start: 2020-11-03 | End: 2020-11-03 | Stop reason: HOSPADM

## 2020-11-03 RX ORDER — GLYCOPYRROLATE 0.2 MG/ML
INJECTION INTRAMUSCULAR; INTRAVENOUS AS NEEDED
Status: DISCONTINUED | OUTPATIENT
Start: 2020-11-03 | End: 2020-11-03 | Stop reason: HOSPADM

## 2020-11-03 RX ORDER — EPHEDRINE SULFATE/0.9% NACL/PF 50 MG/5 ML
SYRINGE (ML) INTRAVENOUS AS NEEDED
Status: DISCONTINUED | OUTPATIENT
Start: 2020-11-03 | End: 2020-11-03 | Stop reason: HOSPADM

## 2020-11-03 RX ORDER — SODIUM CHLORIDE 0.9 % (FLUSH) 0.9 %
5-40 SYRINGE (ML) INJECTION AS NEEDED
Status: DISCONTINUED | OUTPATIENT
Start: 2020-11-03 | End: 2020-11-05 | Stop reason: HOSPADM

## 2020-11-03 RX ORDER — FENTANYL CITRATE 50 UG/ML
50 INJECTION, SOLUTION INTRAMUSCULAR; INTRAVENOUS AS NEEDED
Status: CANCELLED | OUTPATIENT
Start: 2020-11-03

## 2020-11-03 RX ORDER — ALBUMIN HUMAN 50 G/1000ML
SOLUTION INTRAVENOUS AS NEEDED
Status: DISCONTINUED | OUTPATIENT
Start: 2020-11-03 | End: 2020-11-03 | Stop reason: HOSPADM

## 2020-11-03 RX ORDER — LIDOCAINE HYDROCHLORIDE ANHYDROUS AND DEXTROSE MONOHYDRATE .8; 5 G/100ML; G/100ML
INJECTION, SOLUTION INTRAVENOUS
Status: DISCONTINUED | OUTPATIENT
Start: 2020-11-03 | End: 2020-11-03

## 2020-11-03 RX ORDER — LIDOCAINE HYDROCHLORIDE 10 MG/ML
0.1 INJECTION, SOLUTION EPIDURAL; INFILTRATION; INTRACAUDAL; PERINEURAL AS NEEDED
Status: CANCELLED | OUTPATIENT
Start: 2020-11-03

## 2020-11-03 RX ORDER — HYDROMORPHONE HYDROCHLORIDE 1 MG/ML
0.5 INJECTION, SOLUTION INTRAMUSCULAR; INTRAVENOUS; SUBCUTANEOUS
Status: DISCONTINUED | OUTPATIENT
Start: 2020-11-03 | End: 2020-11-05 | Stop reason: HOSPADM

## 2020-11-03 RX ORDER — DEXAMETHASONE SODIUM PHOSPHATE 4 MG/ML
INJECTION, SOLUTION INTRA-ARTICULAR; INTRALESIONAL; INTRAMUSCULAR; INTRAVENOUS; SOFT TISSUE AS NEEDED
Status: DISCONTINUED | OUTPATIENT
Start: 2020-11-03 | End: 2020-11-03 | Stop reason: HOSPADM

## 2020-11-03 RX ORDER — HYDROMORPHONE HYDROCHLORIDE 1 MG/ML
1 INJECTION, SOLUTION INTRAMUSCULAR; INTRAVENOUS; SUBCUTANEOUS
Status: DISCONTINUED | OUTPATIENT
Start: 2020-11-03 | End: 2020-11-05 | Stop reason: HOSPADM

## 2020-11-03 RX ORDER — SCOLOPAMINE TRANSDERMAL SYSTEM 1 MG/1
1 PATCH, EXTENDED RELEASE TRANSDERMAL ONCE
Status: DISCONTINUED | OUTPATIENT
Start: 2020-11-03 | End: 2020-11-05 | Stop reason: HOSPADM

## 2020-11-03 RX ORDER — LIDOCAINE HYDROCHLORIDE 20 MG/ML
INJECTION, SOLUTION EPIDURAL; INFILTRATION; INTRACAUDAL; PERINEURAL AS NEEDED
Status: DISCONTINUED | OUTPATIENT
Start: 2020-11-03 | End: 2020-11-03 | Stop reason: HOSPADM

## 2020-11-03 RX ORDER — BUPIVACAINE HYDROCHLORIDE AND EPINEPHRINE 5; 5 MG/ML; UG/ML
INJECTION, SOLUTION EPIDURAL; INTRACAUDAL; PERINEURAL AS NEEDED
Status: DISCONTINUED | OUTPATIENT
Start: 2020-11-03 | End: 2020-11-03 | Stop reason: HOSPADM

## 2020-11-03 RX ORDER — MIDAZOLAM HYDROCHLORIDE 1 MG/ML
1 INJECTION, SOLUTION INTRAMUSCULAR; INTRAVENOUS AS NEEDED
Status: CANCELLED | OUTPATIENT
Start: 2020-11-03

## 2020-11-03 RX ORDER — KETOROLAC TROMETHAMINE 30 MG/ML
15 INJECTION, SOLUTION INTRAMUSCULAR; INTRAVENOUS EVERY 6 HOURS
Status: COMPLETED | OUTPATIENT
Start: 2020-11-03 | End: 2020-11-05

## 2020-11-03 RX ORDER — DIPHENHYDRAMINE HYDROCHLORIDE 50 MG/ML
12.5 INJECTION, SOLUTION INTRAMUSCULAR; INTRAVENOUS
Status: ACTIVE | OUTPATIENT
Start: 2020-11-03 | End: 2020-11-04

## 2020-11-03 RX ORDER — LIDOCAINE HYDROCHLORIDE ANHYDROUS AND DEXTROSE MONOHYDRATE .8; 5 G/100ML; G/100ML
INJECTION, SOLUTION INTRAVENOUS
Status: DISCONTINUED | OUTPATIENT
Start: 2020-11-03 | End: 2020-11-03 | Stop reason: HOSPADM

## 2020-11-03 RX ORDER — NEOSTIGMINE METHYLSULFATE 1 MG/ML
INJECTION, SOLUTION INTRAVENOUS AS NEEDED
Status: DISCONTINUED | OUTPATIENT
Start: 2020-11-03 | End: 2020-11-03 | Stop reason: HOSPADM

## 2020-11-03 RX ORDER — ENOXAPARIN SODIUM 100 MG/ML
40 INJECTION SUBCUTANEOUS EVERY 24 HOURS
Status: DISCONTINUED | OUTPATIENT
Start: 2020-11-04 | End: 2020-11-05 | Stop reason: HOSPADM

## 2020-11-03 RX ORDER — LORAZEPAM 2 MG/ML
0.5 INJECTION INTRAMUSCULAR
Status: DISCONTINUED | OUTPATIENT
Start: 2020-11-03 | End: 2020-11-05 | Stop reason: HOSPADM

## 2020-11-03 RX ORDER — PHENYLEPHRINE HCL IN 0.9% NACL 0.4MG/10ML
SYRINGE (ML) INTRAVENOUS AS NEEDED
Status: DISCONTINUED | OUTPATIENT
Start: 2020-11-03 | End: 2020-11-03 | Stop reason: HOSPADM

## 2020-11-03 RX ORDER — ONDANSETRON 2 MG/ML
INJECTION INTRAMUSCULAR; INTRAVENOUS AS NEEDED
Status: DISCONTINUED | OUTPATIENT
Start: 2020-11-03 | End: 2020-11-03 | Stop reason: HOSPADM

## 2020-11-03 RX ORDER — ENOXAPARIN SODIUM 100 MG/ML
40 INJECTION SUBCUTANEOUS EVERY 24 HOURS
Status: DISCONTINUED | OUTPATIENT
Start: 2020-11-03 | End: 2020-11-03

## 2020-11-03 RX ORDER — SODIUM CHLORIDE, SODIUM LACTATE, POTASSIUM CHLORIDE, CALCIUM CHLORIDE 600; 310; 30; 20 MG/100ML; MG/100ML; MG/100ML; MG/100ML
125 INJECTION, SOLUTION INTRAVENOUS CONTINUOUS
Status: CANCELLED | OUTPATIENT
Start: 2020-11-03 | End: 2020-11-04

## 2020-11-03 RX ORDER — MIDAZOLAM HYDROCHLORIDE 1 MG/ML
INJECTION, SOLUTION INTRAMUSCULAR; INTRAVENOUS AS NEEDED
Status: DISCONTINUED | OUTPATIENT
Start: 2020-11-03 | End: 2020-11-03 | Stop reason: HOSPADM

## 2020-11-03 RX ORDER — GABAPENTIN 100 MG/1
200 CAPSULE ORAL 2 TIMES DAILY
Status: DISCONTINUED | OUTPATIENT
Start: 2020-11-03 | End: 2020-11-05 | Stop reason: HOSPADM

## 2020-11-03 RX ORDER — FENTANYL CITRATE 50 UG/ML
INJECTION, SOLUTION INTRAMUSCULAR; INTRAVENOUS AS NEEDED
Status: DISCONTINUED | OUTPATIENT
Start: 2020-11-03 | End: 2020-11-03 | Stop reason: HOSPADM

## 2020-11-03 RX ORDER — GABAPENTIN 250 MG/5ML
500 SOLUTION ORAL ONCE
Status: COMPLETED | OUTPATIENT
Start: 2020-11-03 | End: 2020-11-03

## 2020-11-03 RX ORDER — SODIUM CHLORIDE 0.9 % (FLUSH) 0.9 %
5-40 SYRINGE (ML) INJECTION EVERY 8 HOURS
Status: DISCONTINUED | OUTPATIENT
Start: 2020-11-03 | End: 2020-11-05 | Stop reason: HOSPADM

## 2020-11-03 RX ORDER — ONDANSETRON 2 MG/ML
4 INJECTION INTRAMUSCULAR; INTRAVENOUS
Status: DISCONTINUED | OUTPATIENT
Start: 2020-11-03 | End: 2020-11-05 | Stop reason: HOSPADM

## 2020-11-03 RX ORDER — LIDOCAINE HYDROCHLORIDE ANHYDROUS AND DEXTROSE MONOHYDRATE .8; 5 G/100ML; G/100ML
1 INJECTION, SOLUTION INTRAVENOUS CONTINUOUS
Status: ACTIVE | OUTPATIENT
Start: 2020-11-03 | End: 2020-11-04

## 2020-11-03 RX ORDER — MORPHINE SULFATE 10 MG/ML
2 INJECTION, SOLUTION INTRAMUSCULAR; INTRAVENOUS
Status: DISCONTINUED | OUTPATIENT
Start: 2020-11-03 | End: 2020-11-03 | Stop reason: HOSPADM

## 2020-11-03 RX ORDER — ACETAMINOPHEN 500 MG
1000 TABLET ORAL EVERY 6 HOURS
Status: DISCONTINUED | OUTPATIENT
Start: 2020-11-03 | End: 2020-11-05 | Stop reason: HOSPADM

## 2020-11-03 RX ORDER — HYDROMORPHONE HYDROCHLORIDE 1 MG/ML
0.2 INJECTION, SOLUTION INTRAMUSCULAR; INTRAVENOUS; SUBCUTANEOUS
Status: DISCONTINUED | OUTPATIENT
Start: 2020-11-03 | End: 2020-11-03 | Stop reason: HOSPADM

## 2020-11-03 RX ORDER — HYDROCODONE BITARTRATE AND ACETAMINOPHEN 5; 325 MG/1; MG/1
1 TABLET ORAL AS NEEDED
Status: DISCONTINUED | OUTPATIENT
Start: 2020-11-03 | End: 2020-11-03 | Stop reason: HOSPADM

## 2020-11-03 RX ORDER — SUCCINYLCHOLINE CHLORIDE 20 MG/ML
INJECTION INTRAMUSCULAR; INTRAVENOUS AS NEEDED
Status: DISCONTINUED | OUTPATIENT
Start: 2020-11-03 | End: 2020-11-03 | Stop reason: HOSPADM

## 2020-11-03 RX ORDER — KETAMINE HYDROCHLORIDE 10 MG/ML
INJECTION, SOLUTION INTRAMUSCULAR; INTRAVENOUS AS NEEDED
Status: DISCONTINUED | OUTPATIENT
Start: 2020-11-03 | End: 2020-11-03 | Stop reason: HOSPADM

## 2020-11-03 RX ORDER — MAGNESIUM SULFATE HEPTAHYDRATE 40 MG/ML
INJECTION, SOLUTION INTRAVENOUS AS NEEDED
Status: DISCONTINUED | OUTPATIENT
Start: 2020-11-03 | End: 2020-11-03 | Stop reason: HOSPADM

## 2020-11-03 RX ORDER — HYDROMORPHONE HYDROCHLORIDE 2 MG/ML
INJECTION, SOLUTION INTRAMUSCULAR; INTRAVENOUS; SUBCUTANEOUS AS NEEDED
Status: DISCONTINUED | OUTPATIENT
Start: 2020-11-03 | End: 2020-11-03 | Stop reason: HOSPADM

## 2020-11-03 RX ORDER — PROPOFOL 10 MG/ML
INJECTION, EMULSION INTRAVENOUS AS NEEDED
Status: DISCONTINUED | OUTPATIENT
Start: 2020-11-03 | End: 2020-11-03 | Stop reason: HOSPADM

## 2020-11-03 RX ORDER — SODIUM CHLORIDE 9 MG/ML
25 INJECTION, SOLUTION INTRAVENOUS CONTINUOUS
Status: DISCONTINUED | OUTPATIENT
Start: 2020-11-03 | End: 2020-11-03 | Stop reason: HOSPADM

## 2020-11-03 RX ORDER — HYOSCYAMINE SULFATE 0.12 MG/1
0.12 TABLET SUBLINGUAL
Status: DISCONTINUED | OUTPATIENT
Start: 2020-11-03 | End: 2020-11-05 | Stop reason: HOSPADM

## 2020-11-03 RX ORDER — SODIUM CHLORIDE, SODIUM LACTATE, POTASSIUM CHLORIDE, CALCIUM CHLORIDE 600; 310; 30; 20 MG/100ML; MG/100ML; MG/100ML; MG/100ML
INJECTION, SOLUTION INTRAVENOUS
Status: DISCONTINUED | OUTPATIENT
Start: 2020-11-03 | End: 2020-11-03 | Stop reason: HOSPADM

## 2020-11-03 RX ORDER — FENTANYL CITRATE 50 UG/ML
25 INJECTION, SOLUTION INTRAMUSCULAR; INTRAVENOUS
Status: DISCONTINUED | OUTPATIENT
Start: 2020-11-03 | End: 2020-11-03 | Stop reason: HOSPADM

## 2020-11-03 RX ORDER — ROCURONIUM BROMIDE 10 MG/ML
INJECTION, SOLUTION INTRAVENOUS AS NEEDED
Status: DISCONTINUED | OUTPATIENT
Start: 2020-11-03 | End: 2020-11-03 | Stop reason: HOSPADM

## 2020-11-03 RX ORDER — SODIUM CHLORIDE, SODIUM LACTATE, POTASSIUM CHLORIDE, CALCIUM CHLORIDE 600; 310; 30; 20 MG/100ML; MG/100ML; MG/100ML; MG/100ML
75 INJECTION, SOLUTION INTRAVENOUS CONTINUOUS
Status: DISCONTINUED | OUTPATIENT
Start: 2020-11-03 | End: 2020-11-03 | Stop reason: HOSPADM

## 2020-11-03 RX ORDER — PANTOPRAZOLE SODIUM 40 MG/1
40 TABLET, DELAYED RELEASE ORAL DAILY
Status: DISCONTINUED | OUTPATIENT
Start: 2020-11-03 | End: 2020-11-05 | Stop reason: HOSPADM

## 2020-11-03 RX ORDER — NALOXONE HYDROCHLORIDE 0.4 MG/ML
0.4 INJECTION, SOLUTION INTRAMUSCULAR; INTRAVENOUS; SUBCUTANEOUS AS NEEDED
Status: DISCONTINUED | OUTPATIENT
Start: 2020-11-03 | End: 2020-11-05 | Stop reason: HOSPADM

## 2020-11-03 RX ORDER — SODIUM CHLORIDE 0.9 % (FLUSH) 0.9 %
5-40 SYRINGE (ML) INJECTION EVERY 8 HOURS
Status: CANCELLED | OUTPATIENT
Start: 2020-11-03

## 2020-11-03 RX ORDER — DIPHENHYDRAMINE HYDROCHLORIDE 50 MG/ML
12.5 INJECTION, SOLUTION INTRAMUSCULAR; INTRAVENOUS AS NEEDED
Status: DISCONTINUED | OUTPATIENT
Start: 2020-11-03 | End: 2020-11-03 | Stop reason: HOSPADM

## 2020-11-03 RX ADMIN — DEXMEDETOMIDINE HYDROCHLORIDE 10 MCG: 100 INJECTION, SOLUTION, CONCENTRATE INTRAVENOUS at 08:55

## 2020-11-03 RX ADMIN — LIDOCAINE HYDROCHLORIDE 2 MG/KG/HR: 8 INJECTION, SOLUTION INTRAVENOUS at 07:42

## 2020-11-03 RX ADMIN — FENTANYL CITRATE 50 MCG: 50 INJECTION, SOLUTION INTRAMUSCULAR; INTRAVENOUS at 09:05

## 2020-11-03 RX ADMIN — ACETAMINOPHEN 1000 MG: 650 SOLUTION ORAL at 06:14

## 2020-11-03 RX ADMIN — HYDROMORPHONE HYDROCHLORIDE 1 MG: 1 INJECTION, SOLUTION INTRAMUSCULAR; INTRAVENOUS; SUBCUTANEOUS at 16:03

## 2020-11-03 RX ADMIN — Medication 3 MG: at 08:56

## 2020-11-03 RX ADMIN — PROPOFOL 200 MG: 10 INJECTION, EMULSION INTRAVENOUS at 07:36

## 2020-11-03 RX ADMIN — SODIUM CHLORIDE, SODIUM LACTATE, POTASSIUM CHLORIDE, AND CALCIUM CHLORIDE 125 ML/HR: 600; 310; 30; 20 INJECTION, SOLUTION INTRAVENOUS at 18:42

## 2020-11-03 RX ADMIN — GLYCOPYRROLATE 0.2 MG: 0.2 INJECTION, SOLUTION INTRAMUSCULAR; INTRAVENOUS at 07:42

## 2020-11-03 RX ADMIN — ROCURONIUM BROMIDE 40 MG: 10 SOLUTION INTRAVENOUS at 07:42

## 2020-11-03 RX ADMIN — FENTANYL CITRATE 100 MCG: 50 INJECTION, SOLUTION INTRAMUSCULAR; INTRAVENOUS at 07:36

## 2020-11-03 RX ADMIN — KETAMINE HYDROCHLORIDE 50 MG: 10 INJECTION, SOLUTION INTRAMUSCULAR; INTRAVENOUS at 07:45

## 2020-11-03 RX ADMIN — FENTANYL CITRATE 50 MCG: 50 INJECTION, SOLUTION INTRAMUSCULAR; INTRAVENOUS at 09:29

## 2020-11-03 RX ADMIN — Medication 200 MCG: at 08:18

## 2020-11-03 RX ADMIN — GABAPENTIN 200 MG: 100 CAPSULE ORAL at 18:46

## 2020-11-03 RX ADMIN — Medication 10 ML: at 14:00

## 2020-11-03 RX ADMIN — DEXMEDETOMIDINE HYDROCHLORIDE 8 MCG: 100 INJECTION, SOLUTION, CONCENTRATE INTRAVENOUS at 08:34

## 2020-11-03 RX ADMIN — SUCCINYLCHOLINE CHLORIDE 200 MG: 20 INJECTION, SOLUTION INTRAMUSCULAR; INTRAVENOUS at 07:36

## 2020-11-03 RX ADMIN — KETOROLAC TROMETHAMINE 15 MG: 30 INJECTION, SOLUTION INTRAMUSCULAR at 18:43

## 2020-11-03 RX ADMIN — DEXAMETHASONE SODIUM PHOSPHATE 4 MG: 4 INJECTION, SOLUTION INTRAMUSCULAR; INTRAVENOUS at 07:48

## 2020-11-03 RX ADMIN — ALBUMIN (HUMAN) 250 ML: 12.5 INJECTION, SOLUTION INTRAVENOUS at 07:45

## 2020-11-03 RX ADMIN — ACETAMINOPHEN 1000 MG: 500 TABLET ORAL at 12:20

## 2020-11-03 RX ADMIN — PANTOPRAZOLE SODIUM 40 MG: 40 TABLET, DELAYED RELEASE ORAL at 12:20

## 2020-11-03 RX ADMIN — KETOROLAC TROMETHAMINE 15 MG: 30 INJECTION, SOLUTION INTRAMUSCULAR at 12:12

## 2020-11-03 RX ADMIN — ENOXAPARIN SODIUM 40 MG: 40 INJECTION SUBCUTANEOUS at 06:14

## 2020-11-03 RX ADMIN — ROCURONIUM BROMIDE 10 MG: 10 SOLUTION INTRAVENOUS at 07:36

## 2020-11-03 RX ADMIN — ACETAMINOPHEN 1000 MG: 500 TABLET ORAL at 18:47

## 2020-11-03 RX ADMIN — SODIUM CHLORIDE, POTASSIUM CHLORIDE, SODIUM LACTATE AND CALCIUM CHLORIDE: 600; 310; 30; 20 INJECTION, SOLUTION INTRAVENOUS at 07:18

## 2020-11-03 RX ADMIN — LIDOCAINE HYDROCHLORIDE 100 MG: 20 INJECTION, SOLUTION EPIDURAL; INFILTRATION; INTRACAUDAL; PERINEURAL at 07:36

## 2020-11-03 RX ADMIN — Medication 120 MCG: at 08:10

## 2020-11-03 RX ADMIN — GABAPENTIN 500 MG: 250 SOLUTION ORAL at 06:14

## 2020-11-03 RX ADMIN — PHENYLEPHRINE HYDROCHLORIDE 40 MCG/MIN: 10 INJECTION INTRAVENOUS at 08:22

## 2020-11-03 RX ADMIN — ACETAMINOPHEN 1000 MG: 500 TABLET ORAL at 23:42

## 2020-11-03 RX ADMIN — DEXMEDETOMIDINE HYDROCHLORIDE 12 MCG: 100 INJECTION, SOLUTION, CONCENTRATE INTRAVENOUS at 07:49

## 2020-11-03 RX ADMIN — CEFAZOLIN 3 G: 1 INJECTION, POWDER, FOR SOLUTION INTRAMUSCULAR; INTRAVENOUS; PARENTERAL at 07:55

## 2020-11-03 RX ADMIN — ONDANSETRON HYDROCHLORIDE 4 MG: 2 INJECTION, SOLUTION INTRAMUSCULAR; INTRAVENOUS at 09:05

## 2020-11-03 RX ADMIN — DEXMEDETOMIDINE HYDROCHLORIDE 10 MCG: 100 INJECTION, SOLUTION, CONCENTRATE INTRAVENOUS at 08:59

## 2020-11-03 RX ADMIN — SODIUM CHLORIDE, SODIUM LACTATE, POTASSIUM CHLORIDE, AND CALCIUM CHLORIDE 125 ML/HR: 600; 310; 30; 20 INJECTION, SOLUTION INTRAVENOUS at 10:00

## 2020-11-03 RX ADMIN — MAGNESIUM SULFATE 2 G: 2 INJECTION INTRAVENOUS at 07:45

## 2020-11-03 RX ADMIN — Medication 10 MG: at 08:20

## 2020-11-03 RX ADMIN — GLYCOPYRROLATE 0.4 MG: 0.2 INJECTION, SOLUTION INTRAMUSCULAR; INTRAVENOUS at 08:56

## 2020-11-03 RX ADMIN — KETOROLAC TROMETHAMINE 15 MG: 30 INJECTION, SOLUTION INTRAMUSCULAR at 23:43

## 2020-11-03 RX ADMIN — HYDROMORPHONE HYDROCHLORIDE 0.75 MG: 2 INJECTION, SOLUTION INTRAMUSCULAR; INTRAVENOUS; SUBCUTANEOUS at 09:07

## 2020-11-03 RX ADMIN — MIDAZOLAM 2 MG: 1 INJECTION INTRAMUSCULAR; INTRAVENOUS at 07:25

## 2020-11-03 RX ADMIN — ONDANSETRON 4 MG: 2 INJECTION INTRAMUSCULAR; INTRAVENOUS at 12:41

## 2020-11-03 NOTE — PERIOP NOTES
TRANSFER - OUT REPORT:    Verbal report given to [facility] RN on Tosha Gaytan  being transferred to room 519 for routine post - op       Report consisted of patients Situation, Background, Assessment and   Recommendations(SBAR). Time Pre op antibiotic given:  0016  Anesthesia Stop time:  0049  Cohen Present on Transfer to floor: NO  Order for Cohen on Chart: N/a    Information from the following report(s) SBAR and MAR was reviewed with the receiving nurse. Opportunity for questions and clarification was provided. Is the patient on 02? YES       L/Min 2       Other     Is the patient on a monitor? NO    Is the nurse transporting with the patient? NO    Surgical Waiting Area notified of patient's transfer from PACU? not at hospital. Unable to reach by phone. Lines:   Peripheral IV 11/03/20 Left;Posterior Hand (Active)   Site Assessment Clean, dry, & intact 11/03/20 0930   Phlebitis Assessment 0 11/03/20 1000   Infiltration Assessment 0 11/03/20 1000   Dressing Status Clean, dry, & intact 11/03/20 0930   Dressing Type Transparent 11/03/20 0930   Hub Color/Line Status Infusing 11/03/20 1000        The following personal items collected during your admission accompanied patient upon transfer:   Dental Appliance:    Vision:    Hearing Aid:    Jewelry: Jewelry: None  Clothing: Clothing: (clothing to [employer])  Other Valuables:  Other Valuables: None  Valuables sent to safe:

## 2020-11-03 NOTE — INTERVAL H&P NOTE
Update History & Physical 
 
 
The surgery was reviewed with the patient and I examined the patient. There was no change. The surgical site was confirmed by the patient and me. Plan:  The risk, benefits, expected outcome, and alternative to the recommended procedure have been discussed with the patient. Patient understands and wants to proceed with the procedure.  
 
Electronically signed by Kaleigh Orr MD on 11/3/2020 at 7:03 AM

## 2020-11-03 NOTE — ANESTHESIA PREPROCEDURE EVALUATION
Relevant Problems   No relevant active problems       Anesthetic History   No history of anesthetic complications            Review of Systems / Medical History  Patient summary reviewed, nursing notes reviewed and pertinent labs reviewed    Pulmonary  Within defined limits                 Neuro/Psych   Within defined limits           Cardiovascular  Within defined limits  Hypertension                   GI/Hepatic/Renal  Within defined limits              Endo/Other  Within defined limits      Obesity     Other Findings              Physical Exam    Airway  Mallampati: II  TM Distance: > 6 cm  Neck ROM: normal range of motion   Mouth opening: Normal     Cardiovascular  Regular rate and rhythm,  S1 and S2 normal,  no murmur, click, rub, or gallop             Dental  No notable dental hx       Pulmonary  Breath sounds clear to auscultation               Abdominal  GI exam deferred       Other Findings            Anesthetic Plan    ASA: 2  Anesthesia type: general          Induction: Intravenous  Anesthetic plan and risks discussed with: Patient

## 2020-11-03 NOTE — ANESTHESIA POSTPROCEDURE EVALUATION
Procedure(s):  LAPAROSCOPIC SLEEVE GASTRECTOMY, ESOPHAGOGASTRODUODENOSCOPY (E R A S). general    <BSHSIANPOST>    INITIAL Post-op Vital signs:   Vitals Value Taken Time   /68 11/3/2020 10:30 AM   Temp 36.8 °C (98.3 °F) 11/3/2020 10:00 AM   Pulse 92 11/3/2020 10:40 AM   Resp 26 11/3/2020 10:40 AM   SpO2 95 % 11/3/2020 10:40 AM   Vitals shown include unvalidated device data.

## 2020-11-03 NOTE — OP NOTES
[address]   OPERATIVE REPORT    Name:  Arianne Mejia  MR#:  [id number]  :  1990  ACCOUNT #:  [de-identified]  DATE OF SERVICE:  2020    PREOPERATIVE DIAGNOSIS:  Morbid obesity. POSTOPERATIVE DIAGNOSIS:  Morbid obesity. PROCEDURE PERFORMED:  Laparoscopic sleeve gastrectomy with esophagogastroduodenoscopy. SURGEON:  Zayra Meek MD    ASSISTANT:  Dr. Cynthia Torreers:  General.    COMPLICATIONS:  None. SPECIMENS REMOVED:  Partial gastrectomy. IMPLANTS:  None. ESTIMATED BLOOD LOSS:  Less than 50 mL. DRAINS:  None. FINDINGS:  Normal sleeve gastrectomy over a 40-American ViSiGi bougie, normal postoperative EGD, negative leak test.    INDICATIONS FOR OPERATION:  The patient is a 72-year-old female with a history of morbid obesity with a BMI of 52 with no other major bariatric comorbidities, who has been through the necessary preoperative education for surgery. She has undergone the liver shrinking diet. She has been approved by insurance and went through the educational process for surgery. My assistant, Dr. Rachel Blackburn, was necessary for the operation due to the complex nature of the bariatric operation. He was necessary for operation of the camera, retraction and intraoperative decision making. DESCRIPTION OF OPERATION:  The patient was met in the preoperative holding area. The H and P was updated. Consent was signed. All risks and benefits were explained to the patient prior to start of the operation. She was taken back to the operating room. She was lying in a supine position. The abdomen was prepped and draped in standard sterile fashion. Time-out was called. Antibiotics were given. SCDs were on lower extremities. We started the operation by making a 5-mm incision into the left upper quadrant, inserting a VisiPort trocar into the intra-abdominal cavity, insufflating to 15 mmHg.   We then placed a 5-mm trocar superior to the left of the umbilicus, a 90-DS port superior to the right of the umbilicus and a 5-mm right upper quadrant port. We then placed a subxiphoid liver retractor lifting the liver up and out of the way. We had the patient placed in steep reverse Trendelenburg positioning. The stomach appeared to be normal.  There was no evidence of any hiatal hernia. We then started taking down the short gastric vessels from the area of the mid body of the stomach taking down the short gastric vessels and gastrocolic ligament from the greater curve of the stomach, traveling up superiorly, taking great care around the area of the spleen and the hiatus. We exposed the left epifanio and angle of His was taken down completely. All the posterior attachments of the stomach were taken down as well. The operative field was hemostatic. We then continued taking down the gastrocolic ligament from the inferior portion of the stomach down to our janet about 6 cm proximal to pylorus which would be the starting point of our sleeve staple line. All of the stomach was mobilized. We then had the 40-Zimbabwean ViSiGi bougie placed down the mouth into the esophagus and down into the stomach. This was placed along the lesser curve into the area of the antrum and pylorus. It was hooked to suction. The stomach was decompressed. We then started creating a vertical sleeve gastrectomy using a 60-mm Signia stapler with TRS reinforcement for the first firing. We took the first firing of 6 cm proximal to pylorus and stayed away from the incisura to make sure we did not near that area for the sleeve. We then started making our sleeve gastrectomy vertically using 3 more purple loads with TRS reinforcement of the 60-mm Signia stapler and then used one last 30-mm naked load for the last few millimeters of the staple line to complete the sleeve gastrectomy. Our partial gastrectomy specimen was placed into the right upper quadrant away from the operative field.   The ViSiGi tube was removed from the stomach and then we oversewed the staple line with a 2-0 absorbable V-Loc suture buttressing with omentum laterally from the top portion of the staple line all the way down past the mid body of the stomach. The stomach was in its proper orientation. We were satisfied with the surgery. We then performed our endoscopy. We placed the endoscope down the mouth into the esophagus and down into the sleeve stomach. The sleeve stomach appeared to be normal.  There was no bleeding from the staple line. We were able to get the scope down into the area of the pylorus without any difficulty. There was no bending, twisting or narrowing of the stomach. We did a leak test.  We did not find any bubbles coming from the staple line with the saline in the abdominal cavity. We then sucked out the saline from the abdominal cavity, decompressed the stomach with the endoscope, removing the endoscope, passing it off the field and then removed our partial gastrectomy specimen from the 15-mm port site, dilating with a fascial dilator. We passed off our partial gastrectomy specimen for pathology and then closed our 15-mm port fascial defect with an Endo Close suture passing device in an interrupted figure-of-eight fashion. We then looked back into the upper abdomen and everything was hemostatic. Everything looked good. We then removed our  subxiphoid liver retractor and then desufflated the abdominal cavity, removed the trocars and closed the skin with 4-0 Monocryl and Dermabond to complete the operation. Dr. Norman Bravo was present and scrubbed during the entire operation. The counts were correct.       Edsel Homans, MD [id number]  D:  11/03/2020 9:19  T:  11/03/2020 13:08  JOB #:  [id number]

## 2020-11-03 NOTE — BRIEF OP NOTE
Brief Postoperative Note    Patient: Mansoro Blanchard  YOB: 1990  MRN: [id number]    Date of Procedure: 11/3/2020     Pre-Op Diagnosis: MORBID OBESITY    Post-Op Diagnosis: Same as preoperative diagnosis.       Procedure(s):  LAPAROSCOPIC SLEEVE GASTRECTOMY, ESOPHAGOGASTRODUODENOSCOPY (E R A S)    Surgeon(s):  MD Lily Rice MD    Surgical Assistant: Surg Asst-1: Jason WONG    Anesthesia: General     Estimated Blood Loss (mL): less than 50     Complications: None    Specimens:   ID Type Source Tests Collected by Time Destination   1 : PARTIAL GASTRECTOMY Fresh GASTRIC SPECIMEN  Jay Vo MD 11/3/2020 0681 Pathology        Implants: * No implants in log *    Drains: * No LDAs found *    Findings: normal sleeve gastrectomy over a 40Fr Visigi bougie, normal post op EGD, negative leak test    Electronically Signed by Herbie Tirado MD on 11/3/2020 at 9:06 AM

## 2020-11-03 NOTE — ROUTINE PROCESS
Patient: Genet Pérez MRN: [id number]  SSN: [id number] YOB: 1990  Age: 27 y.o. Sex: female Patient is status post Procedure(s): LAPAROSCOPIC SLEEVE GASTRECTOMY, ESOPHAGOGASTRODUODENOSCOPY (E R A S). Surgeon(s) and Role: 
   Kris Pena MD - Primary Funmi Plascencia MD - Assisting Local/Dose/Irrigation:   
 
             
Peripheral IV 11/03/20 Left;Posterior Hand (Active) Site Assessment Clean, dry, & intact 11/03/20 0630 Phlebitis Assessment 0 11/03/20 0630 Infiltration Assessment 0 11/03/20 0630 Dressing Status Clean, dry, & intact; New 11/03/20 0630 Dressing Type Tape;Transparent 11/03/20 0630 Hub Color/Line Status Pink; Infusing 11/03/20 0630 Airway - Endotracheal Tube 11/03/20 Oral (Active) Dressing/Packing:  Wound Abdomen FOR SMALL INCISIONS AND A NEEDLE SITE-Dressing Type: Sutures; Topical skin adhesive/glue (11/03/20 0900) Splint/Cast:  ] Other:

## 2020-11-04 LAB
ANION GAP SERPL CALC-SCNC: 4 MMOL/L (ref 5–15)
BUN SERPL-MCNC: 8 MG/DL (ref 6–20)
BUN/CREAT SERPL: 13 (ref 12–20)
CALCIUM SERPL-MCNC: 8.9 MG/DL (ref 8.5–10.1)
CHLORIDE SERPL-SCNC: 108 MMOL/L (ref 97–108)
CO2 SERPL-SCNC: 27 MMOL/L (ref 21–32)
CREAT SERPL-MCNC: 0.63 MG/DL (ref 0.55–1.02)
ERYTHROCYTE [DISTWIDTH] IN BLOOD BY AUTOMATED COUNT: 14.3 % (ref 11.5–14.5)
GLUCOSE SERPL-MCNC: 88 MG/DL (ref 65–100)
HCT VFR BLD AUTO: 34.9 % (ref 35–47)
HGB BLD-MCNC: 10.9 G/DL (ref 11.5–16)
MCH RBC QN AUTO: 26.7 PG (ref 26–34)
MCHC RBC AUTO-ENTMCNC: 31.2 G/DL (ref 30–36.5)
MCV RBC AUTO: 85.5 FL (ref 80–99)
NRBC # BLD: 0 K/UL (ref 0–0.01)
NRBC BLD-RTO: 0 PER 100 WBC
PLATELET # BLD AUTO: 263 K/UL (ref 150–400)
PMV BLD AUTO: 9.9 FL (ref 8.9–12.9)
POTASSIUM SERPL-SCNC: 3.8 MMOL/L (ref 3.5–5.1)
RBC # BLD AUTO: 4.08 M/UL (ref 3.8–5.2)
SODIUM SERPL-SCNC: 139 MMOL/L (ref 136–145)
WBC # BLD AUTO: 11.8 K/UL (ref 3.6–11)

## 2020-11-04 PROCEDURE — 80048 BASIC METABOLIC PNL TOTAL CA: CPT

## 2020-11-04 PROCEDURE — 36415 COLL VENOUS BLD VENIPUNCTURE: CPT

## 2020-11-04 PROCEDURE — 85027 COMPLETE CBC AUTOMATED: CPT

## 2020-11-04 PROCEDURE — 74011250637 HC RX REV CODE- 250/637: Performed by: SURGERY

## 2020-11-04 PROCEDURE — 65660000000 HC RM CCU STEPDOWN

## 2020-11-04 PROCEDURE — 74011250636 HC RX REV CODE- 250/636: Performed by: SURGERY

## 2020-11-04 RX ORDER — HYDROMORPHONE HYDROCHLORIDE 2 MG/1
4 TABLET ORAL
Status: DISCONTINUED | OUTPATIENT
Start: 2020-11-04 | End: 2020-11-05 | Stop reason: HOSPADM

## 2020-11-04 RX ADMIN — ACETAMINOPHEN 1000 MG: 500 TABLET ORAL at 19:04

## 2020-11-04 RX ADMIN — HYDROMORPHONE HYDROCHLORIDE 4 MG: 2 TABLET ORAL at 17:06

## 2020-11-04 RX ADMIN — PANTOPRAZOLE SODIUM 40 MG: 40 TABLET, DELAYED RELEASE ORAL at 09:42

## 2020-11-04 RX ADMIN — KETOROLAC TROMETHAMINE 15 MG: 30 INJECTION, SOLUTION INTRAMUSCULAR at 06:03

## 2020-11-04 RX ADMIN — SODIUM CHLORIDE, SODIUM LACTATE, POTASSIUM CHLORIDE, AND CALCIUM CHLORIDE 125 ML/HR: 600; 310; 30; 20 INJECTION, SOLUTION INTRAVENOUS at 11:21

## 2020-11-04 RX ADMIN — ACETAMINOPHEN 1000 MG: 500 TABLET ORAL at 13:23

## 2020-11-04 RX ADMIN — GABAPENTIN 200 MG: 100 CAPSULE ORAL at 09:42

## 2020-11-04 RX ADMIN — Medication 10 ML: at 13:26

## 2020-11-04 RX ADMIN — ACETAMINOPHEN 1000 MG: 500 TABLET ORAL at 23:54

## 2020-11-04 RX ADMIN — KETOROLAC TROMETHAMINE 15 MG: 30 INJECTION, SOLUTION INTRAMUSCULAR at 23:55

## 2020-11-04 RX ADMIN — SODIUM CHLORIDE, SODIUM LACTATE, POTASSIUM CHLORIDE, AND CALCIUM CHLORIDE 125 ML/HR: 600; 310; 30; 20 INJECTION, SOLUTION INTRAVENOUS at 19:03

## 2020-11-04 RX ADMIN — HYOSCYAMINE SULFATE 0.12 MG: 0.12 TABLET ORAL; SUBLINGUAL at 19:17

## 2020-11-04 RX ADMIN — KETOROLAC TROMETHAMINE 15 MG: 30 INJECTION, SOLUTION INTRAMUSCULAR at 19:05

## 2020-11-04 RX ADMIN — HYDROMORPHONE HYDROCHLORIDE 4 MG: 2 TABLET ORAL at 11:25

## 2020-11-04 RX ADMIN — SODIUM CHLORIDE, SODIUM LACTATE, POTASSIUM CHLORIDE, AND CALCIUM CHLORIDE 125 ML/HR: 600; 310; 30; 20 INJECTION, SOLUTION INTRAVENOUS at 03:03

## 2020-11-04 RX ADMIN — KETOROLAC TROMETHAMINE 15 MG: 30 INJECTION, SOLUTION INTRAMUSCULAR at 13:23

## 2020-11-04 RX ADMIN — ONDANSETRON 4 MG: 2 INJECTION INTRAMUSCULAR; INTRAVENOUS at 06:03

## 2020-11-04 RX ADMIN — HYOSCYAMINE SULFATE 0.12 MG: 0.12 TABLET ORAL; SUBLINGUAL at 14:15

## 2020-11-04 RX ADMIN — Medication 10 ML: at 06:06

## 2020-11-04 RX ADMIN — ONDANSETRON 4 MG: 2 INJECTION INTRAMUSCULAR; INTRAVENOUS at 19:17

## 2020-11-04 RX ADMIN — ENOXAPARIN SODIUM 40 MG: 40 INJECTION SUBCUTANEOUS at 09:41

## 2020-11-04 RX ADMIN — GABAPENTIN 200 MG: 100 CAPSULE ORAL at 19:04

## 2020-11-04 NOTE — CONSULTS
NUTRITION     Chart reviewed. Post-op bariatric diet instruction completed. Will gladly follow up for additional questions as needed. Thank you.      Pito Cochran RD

## 2020-11-04 NOTE — PROGRESS NOTES
Bedside shift change report given to Carmelo Esquivel (oncoming nurse) by Guido Espinoza RN (offgoing nurse). Report included the following information SBAR, Kardex, ED Summary, OR Summary, Procedure Summary, Intake/Output, MAR, Accordion, Recent Results and Med Rec Status.

## 2020-11-04 NOTE — PROGRESS NOTES
[employer] [facility]    POD #1    Subjective     Doing well, tolerating ice, no more N/V, pain controlled but sore, OOB some    Objective     Patient Vitals for the past 24 hrs:   Temp Pulse Resp BP SpO2   11/04/20 0445 98.9 °F (37.2 °C) 71 16 122/80 95 %   11/04/20 0005 97.9 °F (36.6 °C) 76 16 110/69 95 %   11/03/20 1931 98.1 °F (36.7 °C) 72 17 110/81 97 %   11/03/20 1530 98.4 °F (36.9 °C) (!) 104 18 112/77 96 %   11/03/20 1440 97.4 °F (36.3 °C) (!) 103 18 115/69 96 %   11/03/20 1255 97.8 °F (36.6 °C) 97 20 131/83 96 %   11/03/20 1108 97.8 °F (36.6 °C) 99 20 124/84 93 %   11/03/20 1030  89 27 122/68 94 %   11/03/20 1015  93 29 131/61 95 %   11/03/20 1000 98.3 °F (36.8 °C) 96 28 (!) 131/57 96 %   11/03/20 0945  82 27 (!) 121/59 96 %   11/03/20 0940  92 26 (!) 118/59 93 %   11/03/20 0935  83 30 (!) 114/55 94 %   11/03/20 0930  96 22 (!) 122/59 96 %   11/03/20 0925 98.2 °F (36.8 °C) 80 (!) 32 (!) 119/53 98 %       Date 11/03/20 0700 - 11/04/20 0659 11/04/20 0700 - 11/05/20 0659   Shift 9459-0008 4036-7343 24 Hour Total 3172-0841 7354-6809 24 Hour Total   INTAKE   P.O.  120 120        P. O.  120 120      I. V.(mL/kg/hr) 1196.3(0.7) 792.6(0.5) 1988.9(0.6)        I.V. 150  150        Lidocaine Volume 21.3 42.6 63.9        Volume (lactated Ringers infusion) 650  650        Volume (lactated Ringers infusion)       Other 250  250        Other 250  250      Shift Total(mL/kg) 1446.3(9.9) 912.6(6.2) 2358. 9(16.1)      OUTPUT   Urine(mL/kg/hr) 300(0.2)  300(0.1)        Urine Voided 300  300        Urine Occurrence(s) 1 x 3 x 4 x      Blood 10  10        Estimated Blood Loss 10  10      Shift Total(mL/kg) 310(2.1)  310(2.1)      NET 1136.3 912.6 2048. 9      Weight (kg) 146.6 146.6 146.6 146.6 146.6 146.6       PE  Pulm - CTAB  CV - RRR  Abd - soft,ND, BS present, incisions c/d/i    Labs  Recent Results (from the past 12 hour(s)) METABOLIC PANEL, BASIC    Collection Time: 11/04/20  3:30 AM   Result Value Ref Range    Sodium 139 136 - 145 mmol/L    Potassium 3.8 3.5 - 5.1 mmol/L    Chloride 108 97 - 108 mmol/L    CO2 27 21 - 32 mmol/L    Anion gap 4 (L) 5 - 15 mmol/L    Glucose 88 65 - 100 mg/dL    BUN 8 6 - 20 MG/DL    Creatinine 0.63 0.55 - 1.02 MG/DL    BUN/Creatinine ratio 13 12 - 20      GFR est AA >60 >60 ml/min/1.73m2    GFR est non-AA >60 >60 ml/min/1.73m2    Calcium 8.9 8.5 - 10.1 MG/DL   CBC W/O DIFF    Collection Time: 11/04/20  3:30 AM   Result Value Ref Range    WBC 11.8 (H) 3.6 - 11.0 K/uL    RBC 4.08 3.80 - 5.20 M/uL    HGB 10.9 (L) 11.5 - 16.0 g/dL    HCT 34.9 (L) 35.0 - 47.0 %    MCV 85.5 80.0 - 99.0 FL    MCH 26.7 26.0 - 34.0 PG    MCHC 31.2 30.0 - 36.5 g/dL    RDW 14.3 11.5 - 14.5 %    PLATELET 753 598 - 622 K/uL    MPV 9.9 8.9 - 12.9 FL    NRBC 0.0 0  WBC    ABSOLUTE NRBC 0.00 0.00 - 0.01 K/uL         Assessment     Nickolas Golden is a 27 y. o.yr old female s/p laparoscopic sleeve gastrectomy    Plan     Doing well today  Start bariatric liquid diet  OOB more today  PRN pain meds  Oral dilaudid today  lovenox to start today  Possible DC home later today if doing well    Iesha Wilson MD

## 2020-11-04 NOTE — PROGRESS NOTES
TRANSFER - IN REPORT:    Verbal report received from Dona(name) on Anil Cardenas  being received from PACU(unit) for routine post - op      Report consisted of patients Situation, Background, Assessment and   Recommendations(SBAR). Information from the following report(s) SBAR and OR Summary was reviewed with the receiving nurse. Opportunity for questions and clarification was provided. Assessment completed upon patients arrival to unit and care assumed.

## 2020-11-04 NOTE — PROGRESS NOTES
Transition of Care  RUR: 5%    Patient presents from home to [facility] on 11/3/20 for morbid obesity; laparoscopic sleeve gastrectomy; patient is POD#1    Discharge Plan: TBD: likely home with family/followup with specialist/PCP    Transport Plan: in car with ; Chance Montaño [phone]    Discharge pending;  -pending medical progress  -pending advancement of diet  -pending pain control    1245: CM met with patient at bedside; patient is alert and oriented x 4; patient states she lives at home with her  and children; she is normally independent in her ADLs, drives herself and is employed at [employer]; patients last PCP appointment with Ginny Hernandez NP was in April 2020; preferred pharmacy is the [address] [place] [address] in [facility]. .    Reason for Admission:  Laparoscopic sleeve gastrectomy                    RUR Score:   5%                  Plan for utilizing home health:  TBD; likely home with family/self care/followup with specialist/PCP        PCP: First and Last name:  Ginny Hernandez NP   Name of Practice: [address]   Are you a current patient: Yes/No: yes   Approximate date of last visit: April 2020   Can you participate in a virtual visit with your PCP: yes                    Current Advanced Directive/Advance Care Plan: full code; no acp docs                         Transition of Care Plan:  TBD: likely home with self/family/followup with specialist/PCP; transport by  in car    Care Management Interventions  PCP Verified by CM: Wilder Samuels NP)  Last Visit to PCP: 04/06/20  Mode of Transport at Discharge: Other (see comment)(in car with )  Transition of Care Consult (CM Consult): Other(TBD: likely home with family/followup with specialist/PCP)  Physical Therapy Consult: No  Occupational Therapy Consult: No  Speech Therapy Consult: No  Current Support Network:  Other(TBD: likely home with family/followup with specialist/PCP)  Confirm Follow Up Transport: Family(in car with )  Discharge Location  Discharge Placement: Home with family assistance     CM following  Kiran Sánchez RN, CRM

## 2020-11-05 VITALS
HEART RATE: 65 BPM | TEMPERATURE: 99 F | OXYGEN SATURATION: 97 % | SYSTOLIC BLOOD PRESSURE: 147 MMHG | HEIGHT: 66 IN | WEIGHT: 293 LBS | BODY MASS INDEX: 47.09 KG/M2 | DIASTOLIC BLOOD PRESSURE: 87 MMHG | RESPIRATION RATE: 18 BRPM

## 2020-11-05 LAB
GLUCOSE BLD STRIP.AUTO-MCNC: 75 MG/DL (ref 65–100)
SERVICE CMNT-IMP: NORMAL

## 2020-11-05 PROCEDURE — 90471 IMMUNIZATION ADMIN: CPT

## 2020-11-05 PROCEDURE — 90686 IIV4 VACC NO PRSV 0.5 ML IM: CPT | Performed by: SURGERY

## 2020-11-05 PROCEDURE — 74011250636 HC RX REV CODE- 250/636: Performed by: SURGERY

## 2020-11-05 PROCEDURE — 82962 GLUCOSE BLOOD TEST: CPT

## 2020-11-05 PROCEDURE — 74011250637 HC RX REV CODE- 250/637: Performed by: SURGERY

## 2020-11-05 RX ORDER — HYDROMORPHONE HYDROCHLORIDE 2 MG/1
2 TABLET ORAL
Qty: 30 TAB | Refills: 0 | Status: SHIPPED | OUTPATIENT
Start: 2020-11-05 | End: 2020-11-05 | Stop reason: SDUPTHER

## 2020-11-05 RX ORDER — HYDROMORPHONE HYDROCHLORIDE 2 MG/1
2 TABLET ORAL
Qty: 30 TAB | Refills: 0 | Status: SHIPPED | OUTPATIENT
Start: 2020-11-05 | End: 2020-11-12

## 2020-11-05 RX ORDER — METOCLOPRAMIDE HYDROCHLORIDE 5 MG/ML
10 INJECTION INTRAMUSCULAR; INTRAVENOUS EVERY 6 HOURS
Status: DISCONTINUED | OUTPATIENT
Start: 2020-11-05 | End: 2020-11-05 | Stop reason: HOSPADM

## 2020-11-05 RX ORDER — METOCLOPRAMIDE 5 MG/1
5 TABLET ORAL
Qty: 30 TAB | Refills: 0 | Status: SHIPPED | OUTPATIENT
Start: 2020-11-05 | End: 2020-11-15

## 2020-11-05 RX ORDER — HYDROMORPHONE HYDROCHLORIDE 2 MG/1
2 TABLET ORAL
Qty: 30 TAB | Refills: 0 | Status: SHIPPED | OUTPATIENT
Start: 2020-11-05 | End: 2020-11-05

## 2020-11-05 RX ADMIN — METOCLOPRAMIDE 10 MG: 5 INJECTION, SOLUTION INTRAMUSCULAR; INTRAVENOUS at 09:22

## 2020-11-05 RX ADMIN — HYOSCYAMINE SULFATE 0.12 MG: 0.12 TABLET ORAL; SUBLINGUAL at 12:40

## 2020-11-05 RX ADMIN — ENOXAPARIN SODIUM 40 MG: 40 INJECTION SUBCUTANEOUS at 09:22

## 2020-11-05 RX ADMIN — INFLUENZA VIRUS VACCINE 0.5 ML: 15; 15; 15; 15 SUSPENSION INTRAMUSCULAR at 16:59

## 2020-11-05 RX ADMIN — ACETAMINOPHEN 1000 MG: 500 TABLET ORAL at 06:14

## 2020-11-05 RX ADMIN — GABAPENTIN 200 MG: 100 CAPSULE ORAL at 09:22

## 2020-11-05 RX ADMIN — PANTOPRAZOLE SODIUM 40 MG: 40 TABLET, DELAYED RELEASE ORAL at 09:22

## 2020-11-05 RX ADMIN — KETOROLAC TROMETHAMINE 15 MG: 30 INJECTION, SOLUTION INTRAMUSCULAR at 06:14

## 2020-11-05 RX ADMIN — ACETAMINOPHEN 1000 MG: 500 TABLET ORAL at 12:40

## 2020-11-05 RX ADMIN — METOCLOPRAMIDE 10 MG: 5 INJECTION, SOLUTION INTRAMUSCULAR; INTRAVENOUS at 12:40

## 2020-11-05 NOTE — PROGRESS NOTES
Pt has walked in halls multiple times today, tolerating well, up in chair most of shift today. Taking in liquids but has had several episodes of needing to slow down or stop drinking for a short period of time. Bedside and Verbal shift change report given to Farideh  (oncoming nurse) by [facility]   (offgoing nurse). Report included the following information SBAR and Kardex.

## 2020-11-05 NOTE — PROGRESS NOTES
[facility]    POD #2    Subjective     Feeling a little bettter, drinking a little more this am, pain controlled    Objective     Patient Vitals for the past 24 hrs:   Temp Pulse Resp BP SpO2   11/05/20 0400 98.6 °F (37 °C) 70 18 109/66 95 %   11/05/20 0047 98.5 °F (36.9 °C) 68 18 114/76 96 %   11/04/20 1939 98.5 °F (36.9 °C) 74 18 113/73 98 %   11/04/20 1600 98.8 °F (37.1 °C) 72 16 118/76 98 %   11/04/20 1118 98.4 °F (36.9 °C) 80 16 (!) 149/87 96 %       Date 11/04/20 0700 - 11/05/20 0659 11/05/20 0700 - 11/06/20 0659   Shift 4575-1619 5776-7485 24 Hour Total 7696-8268 2194-1104 24 Hour Total   INTAKE   P.O. 240  240        P. O. 240  240      I. V.(mL/kg/hr)  3250(1.8) 3250(0.9)        Volume (lactated Ringers infusion)  3250 3250      Shift Total(mL/kg) 240(1.6) 3250(22.2) 5165(21.2)      OUTPUT   Urine(mL/kg/hr) 700(0.4) 700(0.4) 1400(0.4)        Urine Voided       Shift Total(mL/kg) 700(4.8) 700(4.8) 1400(9.6)      NET -460 2550 2090      Weight (kg) 146.6 146.6 146.6 146.6 146.6 146.6       PE  Pulm - CTAB  CV - RRR  Abd - soft, ND, BS present, incisions c/d/i    Labs  No results found for this or any previous visit (from the past 12 hour(s)). Assessment     Tre Solorio is a 27 y. o.yr old female s/p sleeve gastrectomy    Plan     Doing a little better  Continue drinking fluids today  If drinking better will DC home today  Try some IV reglan and may DC home on a short course  Possible DC home later, continue IVF    Rosalie Soler MD

## 2020-11-05 NOTE — DISCHARGE INSTRUCTIONS
Laparoscopic Sleeve Gastrectomy    Patient Discharge Instructions    Shante Grossman / [id number] : 1990    Admitted 11/3/2020 Discharged:        · It is important that you take the medication exactly as they are prescribed. · Keep your medication in the bottles provided by the pharmacist and keep a list of the medication names, dosages, and times to be taken in your wallet. · Do not take other medications without consulting your doctor. · Wound care: You may shower starting tomorrow. Do not remove the dermabond on the incision, it will fall of on its own in a few weeks. · No heavy lifting or strenuous activity for 2wks after the operation    Please add a B12 500mcg supplement daily. Because part of your stomach has been removed, you are more at risk for a B12 deficiency. Diet:  Full liquid Bariatric diet as outlined in your education book and on the handout provided by the dietician. Gastric Sleeve  Patient Discharge Instructions  [id number] [facility]   [phone]    1. Activities: You may be active walking, stair climbing, and doing light weight activities with one to two-pound weights, sitting in a chair using different arm motions. Major restrictions include driving until your first office visit and lifting anything heavier than ten pounds. It is very important that you walk frequently. In addition to walking, you should continue to use your incentive spirometer (breathing exercises) throughout the day. 2. Caring for vour incision (s}: Your surgical incision(s) will be covered with Derma bond (super glue). You may shower as desired and simply pat dry the area over the incision(s). There may occasionally be seepage of yellowish to yellowish-maroon dissolved fat from your wound, which is of no major concern as long as the wound is not red, hardened in the area of the drainage, or if the drainage has a foul smell.  If there are any questions, call our office for further instructions. If there is this type of dissolved fat drainage,  the shower and gently express the fluid from your wound. Then keep gauze pads over the area to protect both the wound and your clothes. 3. When to call the office immediately:      *Chest pain (not associated with eating/drinking)  *Shortness of breath (more than normal)  *Sudden pain and/or redness in calf  *Fever greater than 101F  *Persistent nausea and/or vomiting (unable to keep down any liquids)  *Bleeding from incision(s)  *Severe abdominal pain  *Any other concerning symptoms    4. Diet: There are three main priorities as far as your diet is concerned---    a. Clear Liquids-drink from 1 oz. cups or standard shot glass. These liquids are non-carbonated, no added sugar, and not irritating to your stomach. They may include water, tea, coffee\" 100% no added sugar juices (avoid citrus) , clear broth, blenderized clear soups such as Portfolia' Healthy Request Chicken Noodle and Chicken & Rice, Sugar-free products including popsicles, Dakota-Aid, and Crystal Lite. b. Vitamins: Multi-vitamin with iron, Vitamin B-12 and D capsule may be taken as recommended. c. Protein Intake: During your initial two-three weeks when your body is switching from burning glucose to directly burning fat, there is an attempt by your body to use protein as a fuel. To protect that protein, we like you to exercise as listed above, and to try to take in 50-60 grams of protein per day. This can be done with four 8 oz. serving of skim milk and Low Carb Sasser Instant Breakfast. Each 8 oz. should be considered a meal-breakfast, lunch, or supper, and during this mealtime it should be the sole ingested substance. Stop your clear liquids for 20 minutes before your start on the instant breakfast, which is sipped 1 oz. at a time.  You may also try the following substitute for Sasser Instant Breakfast: skim milk-fat free powdered milk + Egg Beaters + flavor extract. If desired, ice may be added and blenderized in the . If the milk upsets your stomach, you may purchase Lactaid tablets from any drug store. Lactaid skim milk may be purchased at most major supermarkets. Another alternative is Boost Diabetic, which is Lactose-free and ready to drink. There are many protein supplements on the market. Whey and Soy based protein powders/drinks are acceptable. If you have any questions about specific products please call the office. d. Other foods may be taken if you have met the requirements of a, b, and c. These foods should be low fat, low sugar, and low spice, and blenderized to the point that, if needed, could be sucked through a straw. One of the favorite treats is dietetic canned fruit blenderized with a combination of its juices and crushed ice, and then eaten in small amounts with a spoon. A smooth low-calorie, low-fat yogurt (should be 100 calories or less) is acceptable as is low-fat or fat-free cottage cheese. If you are having difficulty with your diet, please call the office. 5. Medications: Take no more than 2 pills at a time. Wait 20 minutes between pills. a. Vitamins as listed above---multi-vitamin with iron twice a day, B-12 once a day, vitamin D.    b. Acid reducing medicine--Will be prescribed by your surgeon at discharge. c. Mylanta Plus--one to two teaspoons as needed for belching or gas (other gas relieving medicines are also acceptable Beano, GasX, etc). d. Bowel Regulation--mild laxatives are permissible such as Milk of Magnesia, Dulcolax (either by mouth or suppository). If you are accustomed to using a [id number] laxative not mentioned, you may continue to use it. Fibercon tablets or Benefiber may be taken as a fiber supplement to regulate bowel movements. Fibercon tablets should be broken in half and taken in half and taken one half in the morning and one half in the evening.   Benefiber (1 tsp.) can be added to your protein drink(s). It has no taste or added thickness. Imodium AD may be taken as needed for diarrhea.    e. Pain medication-one to two every four hours as needed for pain control. If pain is mild, try extra-strength Tylenol first.    f. Do not take any pain or arthritis medication such as Aspirin, Advil, Aleve, Naprosyn, or any other nonsteroidal anti-inflammatory medication unless approved by your surgeon. A Tylenol product is OK.    g. Preadmission medications may be resumed, but this should be discussed with your doctor before your discharge from the hospital.    6.  Follow up Office Visits:  call our office at [phone] if you have any questions or concerns. Your appointment should be 2 weeks from your surgery date. Do not plan on returning to work prior to this office visit unless discussed with your doctor. Information obtained by :  I understand that if any problems occur once I am at home I am to contact my physician. I understand and acknowledge receipt of the instructions indicated above.                                                                                                                                            Physician's or R.N.'s Signature                                                                  Date/Time                                                                                                                                              Patient or Representative Signature                                                          Date/Time         MD Sophie Merchant, MESFIN Robles, [address] [facility]

## 2020-11-05 NOTE — DISCHARGE SUMMARY
Physician Discharge Summary     Patient ID:  Roberto Diane  [id number]  44 y.o.  1990    Admit Date: 11/3/2020    Discharge Date:     Admission Diagnoses: Morbid obesity with BMI of 50.0-59.9, adult ([facility] [E66.01, Z68.43]    Discharge Diagnoses: Active Problems: Morbid obesity with BMI of 50.0-59.9, adult ([facility] (11/3/2020)         Admission Condition: Good    Discharge Condition: Good    Procedure(s):    11/3/2020 - Procedure(s):  LAPAROSCOPIC SLEEVE GASTRECTOMY, ESOPHAGOGASTRODUODENOSCOPY (E R A S)      Hospital Course:   She was admitted post op and was not drinking enough on POD 1 for DC home. She was started on some reglan and by afternoon POD 2 drinking beter and DC home. Consults: none    Significant Diagnostic Studies: none    Disposition: home    Patient Instructions:   Current Discharge Medication List      START taking these medications    Details   HYDROmorphone (DILAUDID) 2 mg tablet Take 1 Tab by mouth every four (4) hours as needed for Pain for up to 7 days. Max Daily Amount: 12 mg.  Qty: 30 Tab, Refills: 0    Associated Diagnoses: Morbid obesity with BMI of 50.0-59.9, adult ([facility])      metoclopramide HCl (REGLAN) 5 mg tablet Take 1 Tab by mouth Before breakfast, lunch, and dinner for 10 days. Qty: 30 Tab, Refills: 0         CONTINUE these medications which have NOT CHANGED    Details   ondansetron (ZOFRAN ODT) 4 mg disintegrating tablet Take 1 Tab by mouth every eight (8) hours as needed for Nausea or Vomiting. Qty: 30 Tab, Refills: 0      omeprazole (PRILOSEC) 20 mg capsule Take 1 Cap by mouth daily. Qty: 90 Cap, Refills: 1      hyoscyamine SL (LEVSIN/SL) 0.125 mg SL tablet 1 Tab by SubLINGual route every four (4) hours as needed for Cramping. Qty: 30 Tab, Refills: 0      polyethylene glycol (MIRALAX) 17 gram/dose powder Take 17 g by mouth daily for 30 days. Qty: 510 g, Refills: 0      enoxaparin (LOVENOX) 40 mg/0.4 mL 0.4 mL by SubCUTAneous route daily for 14 days.   Qty: 14 Syringe, Refills: 0      ergocalciferol, vitamin D2, (VITAMIN D2 PO) Take 1 Tab by mouth every seven (7) days.  TAKES ON FRIDAYS         STOP taking these medications       ibuprofen (MOTRIN) 600 mg tablet Comments:   Reason for Stopping:               Activity: No heavy lifting for 2 weeks  Diet: bariatric liquid diet  Wound Care: Keep wound clean and dry    Follow-up with Lara Blackwell MD in 2 week(s)  Follow-up tests/labs none    Signed:  Lara Blackwell MD  11/5/2020  3:43 PM

## 2020-11-06 ENCOUNTER — TELEPHONE (OUTPATIENT)
Dept: SURGERY | Age: 30
End: 2020-11-06

## 2020-11-06 NOTE — TELEPHONE ENCOUNTER
----- Message from [facility] sent at 2020 11:10 AM EST -----  Regardinhr post op call    ----- Message -----  From: Laurann Hatchet  Sent: 2020   8:34 AM EST  To: [facility]  Subject: Discharge Phone Call                             Good morning,    Please call Ms. Makenna Bahena for her discharge phone call. Thank you! Olive

## 2020-11-06 NOTE — TELEPHONE ENCOUNTER
Bariatric Post-Operative Phone Calls: 48 hour phone call    Diet:Question of any nausea and/or vomiting. Protein intake (goal is 60 grams of protein daily)   Poor____Fair____Good____Great__x__     Comment:______________________________________________________________      ______________________________________________________________________    Hydration:Less than 32 ounces of water daily is fair to poor (Goal is 64 ounces per day)   Poor____ Fair____ Good____Great__x__    Comment:______________________________________________________________    ______________________________________________________________________      Ambulation:( walking at least 3 x week, for 15- 20 minutes)     Poor______ Fair______ Good______     Great___x___ Comment:__________________________________________________    ______________________________________________________________________      Urine Color: Question of any odor and color(should be eric, pale, and clear) Dark______ Amber______ Pale______      Clear__x___ Comment:___________________________________________________                           ________________________________________________________________    Bowel movements: Question of any constipation- haven't had any bowel movements for more than 3 days. This could be related to protein intake and/or narcotic pain medication usage. Comment:                                                                                Advised to use miralax, and milk of magnesia if no bowel movement by Saturday patient needs to use a enema                                              Pain: Left sided abdominal pain is normal (should be less than 3)  Question if pain medication is helpful.  10___ 9___ 8___ 7___ 6___ 5___ 4___ 3___     2___1___0_x__Comment:_________________________________________________    ______________________________________________________________________      Incision: (No redness, pain, swelling or fever) Healing Well___x___     Healed______Redness_________ Pain_________     Swelling_________ Fever__________(greater than 101 needs evaluation)    Comment:____________________________________________________________    ______________________________________________________________________  Use of incentive spirometer: Yes__x__       No           Next Appointment:_11/17/20_____________                 Support Group: Yes______No______    Additional Comments:____________________________________________________________    ____________________________________________________________________      If more than one parameter is not met or considered poor, nurse needs to discuss with provider recommend for patient to be seen in the office as soon as possible or refer to the provider for follow-up. Reinforce to patient to use bariatric educational booklet as guide. It is appropriate to refer patient to the nutritionist to discuss more in detail of diet and nutrition.

## 2020-11-06 NOTE — PROGRESS NOTES
Written and verbal instructions provided for pt along with written prescription for oral dilaudid. Prescription for reglan also called in to her pharmacy of choice. Pt has verbalized and given teach back for lovenox injections and when to follow up with MD and things that wouls need to be reported.

## 2020-11-17 ENCOUNTER — OFFICE VISIT (OUTPATIENT)
Dept: SURGERY | Age: 30
End: 2020-11-17
Payer: OTHER GOVERNMENT

## 2020-11-17 VITALS
WEIGHT: 293 LBS | TEMPERATURE: 98.3 F | RESPIRATION RATE: 18 BRPM | HEART RATE: 85 BPM | OXYGEN SATURATION: 95 % | HEIGHT: 66 IN | SYSTOLIC BLOOD PRESSURE: 121 MMHG | BODY MASS INDEX: 47.09 KG/M2 | DIASTOLIC BLOOD PRESSURE: 80 MMHG

## 2020-11-17 DIAGNOSIS — Z09 SURGICAL FOLLOWUP: ICD-10-CM

## 2020-11-17 DIAGNOSIS — E66.01 MORBID OBESITY (HCC): Primary | ICD-10-CM

## 2020-11-17 PROCEDURE — 99024 POSTOP FOLLOW-UP VISIT: CPT | Performed by: NURSE PRACTITIONER

## 2020-11-17 RX ORDER — LANOLIN ALCOHOL/MO/W.PET/CERES
1000 CREAM (GRAM) TOPICAL DAILY
COMMUNITY

## 2020-11-17 RX ORDER — CALCIUM CARBONATE 600 MG
600 TABLET ORAL 2 TIMES DAILY
COMMUNITY

## 2020-11-17 RX ORDER — BISMUTH SUBSALICYLATE 262 MG
1 TABLET,CHEWABLE ORAL DAILY
COMMUNITY

## 2020-11-17 NOTE — PROGRESS NOTES
1. Have you been to the ER, urgent care clinic since your last visit? Hospitalized since your last visit? no    2. Have you seen or consulted any other health care providers outside of the [address] [place] [address] since your last visit? Include any pap smears or colon screening.  no

## 2020-11-17 NOTE — PATIENT INSTRUCTIONS
Constipation: Care Instructions Your Care Instructions Constipation means that you have a hard time passing stools (bowel movements). People pass stools from 3 times a day to once every 3 days. What is normal for you may be different. Constipation may occur with pain in the rectum and cramping. The pain may get worse when you try to pass stools. Sometimes there are small amounts of bright red blood on toilet paper or the surface of stools. This is because of enlarged veins near the rectum (hemorrhoids). A few changes in your diet and lifestyle may help you avoid ongoing constipation. Your doctor may also prescribe medicine to help loosen your stool. Some medicines can cause constipation. These include pain medicines and antidepressants. Tell your doctor about all the medicines you take. Your doctor may want to make a medicine change to ease your symptoms. Follow-up care is a key part of your treatment and safety. Be sure to make and go to all appointments, and call your doctor if you are having problems. It's also a good idea to know your test results and keep a list of the medicines you take. How can you care for yourself at home? · Drink plenty of fluids, enough so that your urine is light yellow or clear like water. If you have kidney, heart, or liver disease and have to limit fluids, talk with your doctor before you increase the amount of fluids you drink. · Include high-fiber foods in your diet each day. These include fruits, vegetables, beans, and whole grains. · Get at least 30 minutes of exercise on most days of the week. Walking is a good choice. You also may want to do other activities, such as running, swimming, cycling, or playing tennis or team sports. · Take a fiber supplement, such as Citrucel or Metamucil, every day. Read and follow all instructions on the label. · Schedule time each day for a bowel movement. A daily routine may help. Take your time having your bowel movement. · Support your feet with a small step stool when you sit on the toilet. This helps flex your hips and places your pelvis in a squatting position. · Your doctor may recommend an over-the-counter laxative to relieve your constipation. Examples are Milk of Magnesia and MiraLax. Read and follow all instructions on the label. Do not use laxatives on a long-term basis. When should you call for help? Call your doctor now or seek immediate medical care if: 
  · You have new or worse belly pain.  
  · You have new or worse nausea or vomiting.  
  · You have blood in your stools. Watch closely for changes in your health, and be sure to contact your doctor if: 
  · Your constipation is getting worse.  
  · You do not get better as expected. Where can you learn more? Go to http://www.gray.com/ Enter 21 316.704.9511 in the search box to learn more about \"Constipation: Care Instructions. \" Current as of: June 26, 2019               Content Version: 12.6 © 0407-4713 [employer]. Care instructions adapted under license by [employer] (which disclaims liability or warranty for this information). If you have questions about a medical condition or this instruction, always ask your healthcare professional. [place] [address] any warranty or liability for your use of this information. What you need to know: 1. Advance your diet to soft foods. Follow the handout that you were given today in the office. 2.  Take the recommended vitamins daily 3. No lifting greater than 20 lbs. 4.  You can do light jogging and walking. 5  Follow up in 2 weeks. 6.  You may go into a pool. 7.  If you are not able to tolerate liquids or soft foods. Please call our office. [phone] 
8. If you have vomiting and persistent epigastric pain or chest pain. You should call our office, the doctor on-call or go to the emergency room. ? Constipation Benefiber, Miralax & similar (once or twice daily) Milk of Magnesia (daily as needed) Dulcolax suppository Fleets Enema Soft and Mushy What is this diet? ? Introduces soft, easy to digest foods ? Low fat, no sugar added When do I begin? ? Once instructed by your surgeon or NP. Usually 2 -3 weeks after surgery ? You will stay on this diet until instructed to start the next phase. What foods can I eat? 
? Moist, mushy foods (see approved list of foods) Key Points ? Continue to drink 48-64 ounces of low calorie, non-carbonated, sugar free beverages between meals. ? Eat 3 meals per day ? Measure each meal to ?cup per meal 
? Aim for 60 grams of protein every day. Try food sources of protein first.  
? Continue to supplement with protein shakes/powder to meet protein goals. ? Take small bites. Try eating with smaller utensils (baby spoon, cocktail fork). ? Chew food thoroughly ? Allow about 30 minutes to eat a meal.  Eating too fast may cause nausea or vomiting. ? Stop eating as soon as you feel full. Overeating may stretch your stomach's capacity and prevent desired weight loss. ? Do not drink liquids during meals and 30 minutes after meals. Drinking with meals may cause nausea or vomiting. ? Add one new food at a time ? Take vitamins daily Shopping Lists Soft and Mushy In addition to everything on the Bariatric Liquid diet, you may add these foods to your diet. Protein - include with every meal 
? Egg or egg substitute ? Low fat or fat-free cottage cheese ? Low fat or fat-free yogurt ? Low fat [place] yogurt ? Fat-free, 1% milk, or Lactaid milk ? Low-fat or vegetarian refried beans ? Well-cooked beans and lentils ? Fat-free or 2% reduced-fat cheese ? Hummus ? Low fat soup Snacks/Other Options: 
? Whole wheat crackers ? Sugar free fudgsicles ? Sugar free cocoa ? No sugar added pudding Fruits and Vegetables ? Applesauce (no sugar added) ? Canned fruit (no sugar added) ? Fresh soft peeled fruits (melons, banana, avocado, berries) ? Any soft cooked vegetables ? Mashed potatoes, Sweet potatoes, baked potatoes (no skin) Condiments ? Fat free non-stick spray ? Herbs and spices ? Lite butter, margarine, canola oil, olive oil ? Reduced-fat or fat-free espinal ? Reduced-fat or fat-free salad dressing ? Reduced-fat or fat-free cream cheese ? Reduced-fat or fat-free sour cream 
? Lemon juice ? Salt, pepper, mustard, ketchup, salsa Prepare food to the appropriate texture. Sample Meal Plan:  Soft and Mushy Breakfast ½ cup plain oatmeal with protein powder. Add cinnamon, nutmeg, Splenda brown sugar as desired for flavor 20-25 grams protein Snack (optional) High protein gelatin (recipe on www.unjury. com) 10 grams protein Lunch ½ cup low fat cottage cheese or [place] yogurt with soft fruit 10 - 15 grams protein Snack (optional) High protein pudding or high protein popsicle (recipe on www.unjury. com) 10 grams protein Dinner ¼ cup low-fat well cooked beans with low-fat cheese sprinkled on top ¼ cup no sugar added applesauce (can sprinkle protein powder) 5-8 grams protein 510  grams protein

## 2020-11-17 NOTE — PROGRESS NOTES
2 weeks status post Sleeve gastrectomy   Pt reports doing well on liquids . Patient complaints of right sided pain, getting better. Pt reports no nausea and no vomiting  Sheis drinking approximately 48+ oz of water daily. She is drinking and eating 60  grams of protein daily. +BM, she was constipated after surgery. She is taking a stool softner now. She is taking all bariatric vitamins without issue. Total weight loss since surgery 19.4lbs  Weight loss since last visit 19.4lbs    Visit Vitals  /80   Pulse 85   Temp 98.3 °F (36.8 °C) (Oral)   Resp 18   Ht 5' 5.75\" (1.67 m)   Wt 306 lb 6.4 oz (139 kg)   SpO2 95%   BMI 49.83 kg/m²            Ms. Vianney Arredondo has a reminder for a \"due or due soon\" health maintenance. I have asked that she contact her primary care provider for follow-up on this health maintenance. Physical Examination: General appearance - alert, well appearing, and in no distress,  Chest - clear to auscultation bilaterally  Heart - normal rate, regular rhythm, normal S1, S2, no murmurs, rubs, clicks or gallops  Abdomen - soft, nontender, nondistended  scars from previous incisions healing without erythema or induration    A/P    Doing well 2 weeks  Status post laparoscopic Sleeve gastrectomy  Diet advanced to soft foods. Focus on 50-60 grams of protein daily. Supplement with unflavored protein powder daily. Encouraged water intake to at least 64 oz of non-carbonated/no calorie beverages. Continue PPI  No lifting greater than 20 lbs  Follow up 2 weeks. Pt  verbalized understanding and questions were answered to the best of my knowledge and ability.         Malissa Salas, CHAVA

## 2020-11-24 ENCOUNTER — TELEPHONE (OUTPATIENT)
Dept: SURGERY | Age: 30
End: 2020-11-24

## 2020-11-24 NOTE — TELEPHONE ENCOUNTER
----- Message from Rosie Cooney sent at 11/6/2020 11:10 AM EST -----  Regarding: 3wk post op call    ----- Message -----  From: Chun Butcher  Sent: 11/6/2020   8:34 AM EST  To: Rosie Cooney  Subject: Discharge Phone Call                             Good morning,    Please call Ms. Gloria Sainz for her discharge phone call. Thank you! Olive

## 2020-11-24 NOTE — TELEPHONE ENCOUNTER
Bariatric Post-Operative Phone Calls: Week 3    Diet:Question of any nausea and/or vomiting. Question of tolerance to diet advancement from liquids to solids. Protein intake (goal is 60 grams of protein daily)   Poor____Fair____Good____Great__x__     Comment:______________________________________________________________      ______________________________________________________________________    Hydration:Less than 32 ounces of water daily is fair to poor (Goal is 64 ounces per day)   Poor____ Fair____ Good____Great__x__    Comment:______________________________________________________________    ______________________________________________________________________      Ambulation:( walking at least 3 x week, for at least 30 minutes)   Poor______ Fair______ Good______     Great__x____ Comment:__________________________________________________    ______________________________________________________________________      Urine Color: Question of any odor and color(should be eric, pale, and clear) Dark______ Amber______ Pale______      Clear__x____ Comment:___________________________________________________                           ________________________________________________________________    Bowel movements: Question of any constipation- haven't had any bowel movements for more than 3 days. This could be related to protein intake and/or narcotic pain medication usage. Comment:                                                                               Having regular bowel movements                                               Pain: Left sided abdominal pain is normal (should be less than 3)         Question if pain medication is helpful.  10___ 9___ 8___ 7___ 6___ 5___ 4___ 3___     2___1___0_x_Comment:_________________________________________________    ______________________________________________________________________      Incision: (No redness, pain, swelling or fever) Healing Well___x___ Healed______Redness_________ Pain_________     Swelling_________ Fever__________(greater than 101 needs evaluation)    Comment:____________________________________________________________    ______________________________________________________________________  Use of incentive spirometer: Yes____       No    x       Next Appointment:_12/1/20_____________                 Support Group: Yes______No___x___    Additional Comments:____________________________________________________________    ____________________________________________________________________      If more than one parameter is not met or considered poor, nurse needs to discuss with provider recommend for patient to be seen in the office as soon as possible or refer to the provider for follow-up. Reinforce to patient to use bariatric educational booklet as guide. It is appropriate to refer patient to the nutritionist to discuss more in detail of diet and nutrition.

## 2020-12-01 ENCOUNTER — VIRTUAL VISIT (OUTPATIENT)
Dept: SURGERY | Age: 30
End: 2020-12-01
Payer: OTHER GOVERNMENT

## 2020-12-01 VITALS — BODY MASS INDEX: 47.09 KG/M2 | HEIGHT: 66 IN | WEIGHT: 293 LBS

## 2020-12-01 DIAGNOSIS — Z09 SURGICAL FOLLOWUP: ICD-10-CM

## 2020-12-01 DIAGNOSIS — E66.01 MORBID OBESITY (HCC): Primary | ICD-10-CM

## 2020-12-01 PROCEDURE — 99024 POSTOP FOLLOW-UP VISIT: CPT | Performed by: NURSE PRACTITIONER

## 2020-12-01 RX ORDER — GLUCOSAMINE SULFATE 1500 MG
POWDER IN PACKET (EA) ORAL DAILY
COMMUNITY
End: 2021-05-24

## 2020-12-01 NOTE — PATIENT INSTRUCTIONS
Constipation: Care Instructions Your Care Instructions Constipation means that you have a hard time passing stools (bowel movements). People pass stools from 3 times a day to once every 3 days. What is normal for you may be different. Constipation may occur with pain in the rectum and cramping. The pain may get worse when you try to pass stools. Sometimes there are small amounts of bright red blood on toilet paper or the surface of stools. This is because of enlarged veins near the rectum (hemorrhoids). A few changes in your diet and lifestyle may help you avoid ongoing constipation. Your doctor may also prescribe medicine to help loosen your stool. Some medicines can cause constipation. These include pain medicines and antidepressants. Tell your doctor about all the medicines you take. Your doctor may want to make a medicine change to ease your symptoms. Follow-up care is a key part of your treatment and safety. Be sure to make and go to all appointments, and call your doctor if you are having problems. It's also a good idea to know your test results and keep a list of the medicines you take. How can you care for yourself at home? · Drink plenty of fluids, enough so that your urine is light yellow or clear like water. If you have kidney, heart, or liver disease and have to limit fluids, talk with your doctor before you increase the amount of fluids you drink. · Include high-fiber foods in your diet each day. These include fruits, vegetables, beans, and whole grains. · Get at least 30 minutes of exercise on most days of the week. Walking is a good choice. You also may want to do other activities, such as running, swimming, cycling, or playing tennis or team sports. · Take a fiber supplement, such as Citrucel or Metamucil, every day. Read and follow all instructions on the label. · Schedule time each day for a bowel movement. A daily routine may help. Take your time having your bowel movement. · Support your feet with a small step stool when you sit on the toilet. This helps flex your hips and places your pelvis in a squatting position. · Your doctor may recommend an over-the-counter laxative to relieve your constipation. Examples are Milk of Magnesia and MiraLax. Read and follow all instructions on the label. Do not use laxatives on a long-term basis. When should you call for help? Call your doctor now or seek immediate medical care if: 
  · You have new or worse belly pain.  
  · You have new or worse nausea or vomiting.  
  · You have blood in your stools. Watch closely for changes in your health, and be sure to contact your doctor if: 
  · Your constipation is getting worse.  
  · You do not get better as expected. Where can you learn more? Go to http://www.gray.com/ Enter 21 757.843.7819 in the search box to learn more about \"Constipation: Care Instructions. \" Current as of: June 26, 2019               Content Version: 12.6 © 3207-3534 [employer]. Care instructions adapted under license by [employer] (which disclaims liability or warranty for this information). If you have questions about a medical condition or this instruction, always ask your healthcare professional. [place] [address] any warranty or liability for your use of this information. What you need to know: 
1 . Advance your diet to moist meats. Follow the handout that you were given today in the office. 2. Take the recommended vitamins daily 3 No lifting greater than 40 lbs. 4. You can do light jogging, moderate walking and a recumbent bike. 5 Follow up in 2 weeks. 6. You may go into a pool. 7. If you are not able to tolerate liquids, soft foods or moist meats. Please call our office. [phone] 
8. If you have vomiting and persistent epigastric pain or chest pain. You should call our office, the doctor on-call or go to the emergency room. ? Constipation Benefiber, Miralax & similar (once or twice daily) Milk of Magnesia (daily as needed) Dulcolax suppository Fleets Enema Moist Meats What is this diet? ? This phase adds moist meats in addition to foods in Soft & Mushy ? Lean protein sources When do I begin? ? When directed by your NP or surgeon, usually 4 weeks after surgery What new foods can I eat? 
? Moist meat and poultry ? Moist fish and seafood Shopping Lists Protein - include with every meal 
? Tuna packed in water ? [place] (canned, frozen, fresh) ? White flaky fish (patricia, cod, flounder, tilapia) ? Canned chicken packed in water ? 96-99% fat free thinly sliced deli meat (ham, turkey, chicken) ? Silken Tofu  
? Skinless turkey or chicken (prepare to a soft texture) ? Lean ground meat ? Lean pork (cooked until very tender, cut into small pieces) Sample Meal Plan:  Moist Meats Breakfast ½ cup soft cooked eggs (2) 16 grams protein Snack (optional) Low-fat string cheese 5 grams protein Lunch 2 slices of lean deli turkey (2 oz.), ¼ cup soft fruit or ½ cup tuna salad made with low-fat mayonnaise 14 grams protein 14 grams protein Snack (optional) Greek yogurt or low-fat cottage cheese or low-fat string cheese 8-15 grams protein Dinner Soft/flaky fish (2 oz.) ¼ cup soft cooked vegetables 14 grams protein

## 2020-12-01 NOTE — PROGRESS NOTES
1. Have you been to the ER, urgent care clinic since your last visit? Hospitalized since your last visit?no    2. Have you seen or consulted any other health care providers outside of the [address] [place] [address] since your last visit? Include any pap smears or colon screening.  No

## 2020-12-01 NOTE — PROGRESS NOTES
I was in the office while conducting this encounter. Consent:  She and/or her healthcare decision maker is aware that this patient-initiated Telehealth encounter is a billable service, with coverage as determined by her insurance carrier. She is aware that she may receive a bill and has provided verbal consent to proceed: Yes    This virtual visit was conducted via ipvive. Pursuant to the emergency declaration under the [facility] [place] [address] waiver authority and the [employer] and [employer] Act, this Virtual  Visit was conducted to reduce the patient's risk of exposure to COVID-19 and provide continuity of care for an established patient. Services were provided through a video synchronous discussion virtually to substitute for in-person clinic visit. Due to this being a TeleHealth evaluation, many elements of the physical examination are unable to be assessed. Total Time: minutes: 5-10 minutes. 4 weeks status post Sleeve gastrectomy   Pt reports doing well on liquids and soft foods. Patient has no complaints of pain  Pt reports no nausea and no vomiting  Sheis drinking approximately 48+ oz of water daily. She is drinking and eating 30 grams of protein daily. She is moving her bowels without issue. She is taking all bariatric vitamins without issue. At her previous appointment she had lost 19.4 pounds. Her scale was broken today so we have no current weight. Visit Vitals  Ht 5' 5.75\" (1.67 m)   Wt 306 lb 9.6 oz (139.1 kg)   BMI 49.86 kg/m²              Ms. Rhonda Mueller has a reminder for a \"due or due soon\" health maintenance. I have asked that she contact her primary care provider for follow-up on this health maintenance.       Physical Examination: General appearance - alert, well appearing, and in no distress,  Chest -no respiratory distress    Abdomen -   scars from previous incisions healing without erythema or induration    A/P    Doing well 4 weeks  Status post laparoscopic Sleeve gastrectomy  Diet advanced to soft meats  Focus on 50-60 grams of protein daily. Supplement with unflavored protein. Powder daily. We discussed working on increasing protein daily. Encouraged water intake to at least 64 oz of non-carbonated/no calorie beverages. May continue to walk for exercise. Continue vitamins    No lifting greater than 40 lbs  Follow up 2 weeks      Pt verbalized understanding and questions were answered to the best of my knowledge and ability. diet educational materials were provided.       Rajiv Anderson, CHAVA

## 2020-12-17 ENCOUNTER — VIRTUAL VISIT (OUTPATIENT)
Dept: SURGERY | Age: 30
End: 2020-12-17
Payer: OTHER GOVERNMENT

## 2020-12-17 VITALS — WEIGHT: 293 LBS | HEIGHT: 66 IN | BODY MASS INDEX: 47.09 KG/M2

## 2020-12-17 DIAGNOSIS — E66.01 MORBID OBESITY (HCC): ICD-10-CM

## 2020-12-17 DIAGNOSIS — Z09 FOLLOW-UP EXAMINATION AFTER ABDOMINAL SURGERY: Primary | ICD-10-CM

## 2020-12-17 PROCEDURE — 99024 POSTOP FOLLOW-UP VISIT: CPT | Performed by: NURSE PRACTITIONER

## 2020-12-17 NOTE — PROGRESS NOTES
1. Have you been to the ER, urgent care clinic since your last visit? Hospitalized since your last visit? No    2. Have you seen or consulted any other health care providers outside of the [address] [place] [address] since your last visit? Include any pap smears or colon screening.  No

## 2020-12-17 NOTE — PROGRESS NOTES
I was in the office while conducting this encounter. Consent:  She and/or her healthcare decision maker is aware that this patient-initiated Telehealth encounter is a billable service, with coverage as determined by her insurance carrier. She is aware that she may receive a bill and has provided verbal consent to proceed: No - Not billable    This virtual visit was conducted via Variab.ly. Pursuant to the emergency declaration under the [facility] waiver authority and the [employer] and [employer] Act, this Virtual  Visit was conducted to reduce the patient's risk of exposure to COVID-19 and provide continuity of care for an established patient. Services were provided through a video synchronous discussion virtually to substitute for in-person clinic visit. Due to this being a TeleHealth evaluation, many elements of the physical examination are unable to be assessed. Total Time: minutes: 11-20 minutes. Chief Complaint   Patient presents with    Surgical Follow-up     6 weeks s/p lap sleeve gastrectomy down 30 pounds, lost 10.5 pounds       Tre Solorio is 6 weeks status post Sleeve gastrectomy for treatment of morbid obesity . Presents today for obesity management. Patient has lost 30 lbs. Since surgery. Patient doesn't think she has lost enough weight. Patient is consuming 60+ grams of protein daily. Premiere shake every day + protein water and moist meats   Doesn't feel \"restriction or full\" ; some hunger   Measuring meals   Patient is drinking 48 oz of fluids per day. Bowels moving most days   Using laxatives colace every other day   Nausea  no  Regurgitation  no  No fever or chills, chest pain or shortness of breath.   Vitamin compliance yes  Activity  Low impact [place]   Sleep   Ok     Physical Exam  Visit Vitals  Ht 5' 5.75\" (1.67 m)   Wt 296 lb (134.3 kg)   BMI 48.14 kg/m²     A + O x 3, looks well Breathing unlabored and regular   Speech clear   Abdomen is well healed   Ambulating independently       ICD-10-CM ICD-9-CM    1. Follow-up examination after abdominal surgery  Z09 V67.09    2. Morbid obesity ([address]  E66.01 278.01    3. BMI 45.0-49.9, adult [facility])  Z68.42 V85.42        Karime Nuñez is 6 weeks  s/p Sleeve gastrectomy for treatment of morbid obesity  doing well   Diet regular texture bariatric   Continue vitamins and protein supplements   Activity daily walking, alicia no activity restrictions   Sleep hygiene reviewed   Follow-up in 6 weeks 1/14/21 at 8:40am   Support group  Karime Nuñez verbalized understanding and questions were answered to the best of my knowledge and ability. Diet, activity and mindfulness educational materials were provided. 16 minutes spent virtually  with Karime Nuñez > 50% counseling.

## 2020-12-17 NOTE — PATIENT INSTRUCTIONS
You are doing great! Take a break from the weight loss social media stuff. It gets into your head too much and this is your journey. Next appt 1/14/21 at 8:40am with MESFIN Moreira Virtual Visit Danelle Sharma and Happy New Year! Solid Textured Foods What is this diet? ? This phase is a slow reintroduction of textured food, starting with those easiest tolerated ? Supplement with protein shakes/powders as needed What foods can I eat? ? Lean Protein ? Vegetables ? Fruit ? Low Fat Dairy Choose foods wisely. Think about the nutritional benefit of the food. Protein first and at each meal.  Include produce (vegetables and/or fruits) at each meal.   
Limit or eliminate \"filler\" foods such as breads, pasta, potatoes, rice, crackers, pretzels, and the like. Avoid eating and drinking together, there just isn't enough room! You may continue protein supplementation if needed to meet your daily protein goals. Many patients use a protein shake or bar to replace a meal.  There are a variety of protein supplements listed in your handbook. Remember, the dietician is available for additional support. For an appointment, simply call or e-mail Jamie Way RD at [phone] or [email or web address]. Take your vitamins every day, attend support group and keep your regular follow-up appointments. Ultimately, success depends on you. Choose to use your tool and we will guide you along the way! Zoom Support Group 2nd Thursday of each Month from 6-7 pm  
The next one is 1/14/21 6-7 pm  
You can access the link at [email or web address]) Go to the Calendar tab and click on the date and it should go right to the link Additional Resources: 
Unjury:  [email or web address]  
? Offering online support groups and pre-recorded videos 
o Every Wednesday evening at 7:00 pm  
? Private Facebook page  [address] o Recipes, Advice, and Motivation from fellow patients Bariatric Pal: [email or web address] 
BariatricPal has launched a podcast!  Hosted by Naga Verdugo, our podcast will cover topics about obesity, pre-weight loss surgery, post-weight loss surgery, food addiction, emotional eating, myths about weight loss surgery, and more. Each episode will feature an expert in the field of weight loss and bariatric surgery who can provide a deeper insight into these topics. BlogGlue:  [employer] 
? Offering discounted exercise programs (8 Week programs, On-Demand/Virtual) ? See flyer ? Contact Rickey Love at [email or web address] or [phone]

## 2021-01-14 ENCOUNTER — VIRTUAL VISIT (OUTPATIENT)
Dept: SURGERY | Age: 31
End: 2021-01-14
Payer: OTHER GOVERNMENT

## 2021-01-14 VITALS — WEIGHT: 287.7 LBS | BODY MASS INDEX: 46.24 KG/M2 | HEIGHT: 66 IN

## 2021-01-14 DIAGNOSIS — E66.01 MORBID OBESITY WITH BMI OF 45.0-49.9, ADULT (HCC): ICD-10-CM

## 2021-01-14 DIAGNOSIS — Z98.84 S/P LAPAROSCOPIC SLEEVE GASTRECTOMY: ICD-10-CM

## 2021-01-14 DIAGNOSIS — E61.1 IRON DEFICIENCY: ICD-10-CM

## 2021-01-14 DIAGNOSIS — E53.8 B12 DEFICIENCY: ICD-10-CM

## 2021-01-14 DIAGNOSIS — E55.9 VITAMIN D DEFICIENCY: ICD-10-CM

## 2021-01-14 DIAGNOSIS — Z09 FOLLOW-UP EXAMINATION AFTER ABDOMINAL SURGERY: Primary | ICD-10-CM

## 2021-01-14 PROCEDURE — 99024 POSTOP FOLLOW-UP VISIT: CPT | Performed by: NURSE PRACTITIONER

## 2021-01-14 NOTE — PROGRESS NOTES
1. Have you been to the ER, urgent care clinic since your last visit? Hospitalized since your last visit? no    2. Have you seen or consulted any other health care providers outside of the [address] [place] [address] since your last visit? Include any pap smears or colon screening.  no

## 2021-01-15 RX ORDER — ERGOCALCIFEROL 1.25 MG/1
50000 CAPSULE ORAL
Qty: 5 CAP | Refills: 2 | Status: SHIPPED | OUTPATIENT
Start: 2021-01-15

## 2021-01-15 NOTE — PATIENT INSTRUCTIONS
Plan on follow up in 3 months. You can request via Getaround for an appointment or you can call [phone]. I placed a lab order to recheck your labs / vitamins. You can go to any [address] [place], [place] [address]vd do not need a paper order (but I put one in the mail) and [employer] can access the request in the system. For locations, visit [email or web address]. [email or web address] Continue your bariatric vitamins and the weekly vitamin D. I will renew your once a week D and we will check your levels with your labs. I will follow up with you when I get your labs back. Continue to focus your diet on lean proteins and monitor your portions. Use a small plate or bowl. Aim for 2 L of water a day. If you would like to schedule an appointment with the dietician, please call or email Yvette Christianson RD at: 
 
[phone] [address] Support Group 2nd Thursday of each Month from 6-7 pm  
The next one is 2/11/21 6-7 pm  
You can access the link at [email or web address] Go to the Calendar tab and click on the date and it should go right to the link Additional Resources: 
Unjury:  [email or web address]  
? Offering online support groups and pre-recorded videos 
o Every Wednesday evening at 7:00 pm  
? Private Facebook page  [address], Advice, and Motivation from fellow patients Bariatric Pal: [email or web address] 
BariatricPal has launched a podcast!  Hosted by Judith De La Garza, our podcast will cover topics about obesity, pre-weight loss surgery, post-weight loss surgery, food addiction, emotional eating, myths about weight loss surgery, and more. Each episode will feature an expert in the field of weight loss and bariatric surgery who can provide a deeper insight into these topics. ACAC:  [email or web address] 
? Offering discounted exercise programs (8 Week programs, On-Demand/Virtual) ? See flyer ? Contact Huston Siemens at [email or web address] or [phone]

## 2021-01-15 NOTE — PROGRESS NOTES
I was in the office while conducting this encounter. Consent:  She and/or her healthcare decision maker is aware that this patient-initiated Telehealth encounter is a billable service, with coverage as determined by her insurance carrier. She is aware that she may receive a bill and has provided verbal consent to proceed: No - Not billable    This virtual visit was conducted via inZair. Pursuant to the emergency declaration under the [facility], [facility] waiver authority and the [employer] and [employer] Act, this Virtual  Visit was conducted to reduce the patient's risk of exposure to COVID-19 and provide continuity of care for an established patient. Services were provided through a video synchronous discussion virtually to substitute for in-person clinic visit. Due to this being a TeleHealth evaluation, many elements of the physical examination are unable to be assessed. Total Time: minutes: 5-10 minutes. Chief Complaint   Patient presents with    Surgical Follow-up     10 weeks s/p lap sleeve gastrectomy down 39.7 pounds lost [phone]. [facility] 10-week status post laparoscopic sleeve gastrectomy for treatment of morbid obesity. She presents today for obesity management. She is doing well has no abdominal pain. No fevers, chills, chest pain or shortness of breath  She is taking her vitamin supplements supplements consistently  She says she is out of the vitamin D and is asking about a refill  She has no GERD symptoms  Bowels are moving regularly  She says she does not really feel full  She is tolerating regular texture and continues to use a protein shake most days. She does feel hungry during the day. She has been good about measuring and really trying to pay attention to her portions. She is walking some for activity. She has in nursing school full-time.   Visit Vitals  Ht 5' 5.75\" (1.67 m)   Wt 287 lb 11.2 oz (130.5 kg)   BMI 46.79 kg/m²     Alert and oriented x3  Speech is clear breathing is unlabored  Abdomen appears soft and is well-healed  She is ambulating independently    ICD-10-CM ICD-9-CM    1. Follow-up examination after abdominal surgery  Z09 V67.09 CBC W/O DIFF      VITAMIN B12 & FOLATE      VITAMIN D, 25 HYDROXY      VITAMIN B1, WHOLE BLOOD      IRON PROFILE      METABOLIC PANEL, COMPREHENSIVE   2. Morbid obesity with BMI of 45.0-49.9, adult (HCC)  E66.01 278.01 CBC W/O DIFF    Z68.42 V85.42 VITAMIN B12 & FOLATE      VITAMIN D, 25 HYDROXY      VITAMIN B1, WHOLE BLOOD      IRON PROFILE      METABOLIC PANEL, COMPREHENSIVE   3. Vitamin D deficiency  E55.9 268.9 CBC W/O DIFF      VITAMIN B12 & FOLATE      VITAMIN D, 25 HYDROXY      VITAMIN B1, WHOLE BLOOD      IRON PROFILE      METABOLIC PANEL, COMPREHENSIVE   4. S/P laparoscopic sleeve gastrectomy  Z98.84 V45.86 CBC W/O DIFF      VITAMIN B12 & FOLATE      VITAMIN D, 25 HYDROXY      VITAMIN B1, WHOLE BLOOD      IRON PROFILE      METABOLIC PANEL, COMPREHENSIVE   5. Iron deficiency  E61.1 280.9 CBC W/O DIFF      VITAMIN B12 & FOLATE      VITAMIN D, 25 HYDROXY      VITAMIN B1, WHOLE BLOOD      IRON PROFILE      METABOLIC PANEL, COMPREHENSIVE   6.  B12 deficiency  E53.8 266.2 CBC W/O DIFF      VITAMIN B12 & FOLATE      VITAMIN D, 25 HYDROXY      VITAMIN B1, WHOLE BLOOD      IRON PROFILE      METABOLIC PANEL, COMPREHENSIVE     10-week status post laparoscopic sleeve gastrectomy doing well  We will get vitamin labs and I will mail her a lab order  She should continue her weekly vitamin D in addition to her bariatric vitamins  Reviewed diet and portions  Importance of measuring for her in particular since she really does not have a sense of fullness  Continue to focus on protein and vegetables  Daily walking and aim for 30 minutes a day  Support group information provided and sent through the patient portal  Plan on follow-up in 3 months sooner if bony Nieves verbalized understanding and questions were answered to the best of my knowledge and ability. Diet, activity and support group educational materials were provided.

## 2021-03-05 ENCOUNTER — TELEPHONE (OUTPATIENT)
Dept: SURGERY | Age: 31
End: 2021-03-05

## 2021-05-24 ENCOUNTER — OFFICE VISIT (OUTPATIENT)
Dept: SURGERY | Age: 31
End: 2021-05-24
Payer: OTHER GOVERNMENT

## 2021-05-24 VITALS
HEIGHT: 65 IN | HEART RATE: 77 BPM | RESPIRATION RATE: 17 BRPM | DIASTOLIC BLOOD PRESSURE: 65 MMHG | SYSTOLIC BLOOD PRESSURE: 98 MMHG | BODY MASS INDEX: 44.62 KG/M2 | OXYGEN SATURATION: 95 % | TEMPERATURE: 99 F | WEIGHT: 267.8 LBS

## 2021-05-24 DIAGNOSIS — G47.9 SLEEP DISTURBANCE: ICD-10-CM

## 2021-05-24 DIAGNOSIS — E66.01 MORBID OBESITY (HCC): Primary | ICD-10-CM

## 2021-05-24 PROCEDURE — 99213 OFFICE O/P EST LOW 20 MIN: CPT | Performed by: NURSE PRACTITIONER

## 2021-05-24 RX ORDER — OMEPRAZOLE 20 MG/1
20 CAPSULE, DELAYED RELEASE ORAL DAILY
Qty: 90 CAPSULE | Refills: 1 | Status: SHIPPED | OUTPATIENT
Start: 2021-05-24

## 2021-05-24 NOTE — PATIENT INSTRUCTIONS
Trial off the Omeprazole. Start taking every other day x 1 week and then discontinue. If your stomach feels ok, stay off. If you start having reflux try taking every other day and if not sufficient resume every day. Sleep is key for weight loss / management Aim for 7 hours / night (which means go to bed earlier) If you nap, limit to < 60 minutes (set an alarm) Work on more protein in the morning and aim for 20-30 grams per meal  
 
If you would like to schedule an appointment with the dietician, please call or email Richard Black RD at: 
 
[phone] [email or web address]

## 2021-05-24 NOTE — PROGRESS NOTES
1. Have you been to the ER, urgent care clinic since your last visit? Hospitalized since your last visit? No    2. Have you seen or consulted any other health care providers outside of the [address] [place] [address] since your last visit? Include any pap smears or colon screening.  No

## 2021-06-03 NOTE — PROGRESS NOTES
Chief Complaint   Patient presents with    Morbid Obesity     6 months s/p sleeve. Down 59.6 lbs. Loss 19.9lbs.  Weight Loss       [place] is 6 months status post sleeve gastrectomy. Presents today for obesity management. Patient has lost 59 lbs. Since surgery. Patient is satisfied with progress. Patient is consuming +/- 40 grams of protein daily. Patient is drinking 48 oz of fluids per day. Bowels moving most days     Constipated  prn  Using laxatives prn  Nausea  no  Regurgitation  rare   No GERD  No fever or chills, chest pain or shortness of breath. Vitamin compliance yes  Activity  none  Sleep   Poor   In school full time for health care   Works full time for [employer] is [place] and has small children       Co-Morbid(s)     Was anti coagulation initiated for presumed / confirmed DVT/PE? NO    Was an incisional hernia noted on exam?       NO      COMORBIDITY     SLEEP APNEA                 NO        GERD  (req.meds)           YES  HYPERLIPIDEMIA           NO  HYPERTENSION             NO       IF YES, # OF HTN MEDICATIONS 0  DIABETES                        NO      IF YES, 0 NON-INSULIN   0 INSULIN     Pre op weight 325 lbs   Current  267 lbs     Physical Exam  Visit Vitals  BP 98/65 (BP 1 Location: Left upper arm, BP Patient Position: Sitting, BP Cuff Size: Adult long)   Pulse 77   Temp 99 °F (37.2 °C) (Oral)   Resp 17   Ht 5' 5\" (1.651 m)   Wt 267 lb 12.8 oz (121.5 kg)   SpO2 95%   BMI 44.56 kg/m²     A + O x 3  Chest  CTA, unlabored   COR  RRR  ABD Soft, obese,NT/ND, no masses or hernias   EXT No edema; ambulating independently       ICD-10-CM ICD-9-CM    1. Morbid obesity ([facility] [address]  E66.01 278.01    2. [facility] 40.0-44.9, [facility]  Z68.41 V85.41    3. Sleep disturbance  G47.9 780.50        [place] is 6 months s/p sleeve gastrectomy   34% excess weight loss   GERD not an issue, so trial off Omeprazole and take every other day x 1 week, then DC.   If GERD symptoms resume at 20 mg every day - every other day   Stress and poor sleep major concerns at this time   Diet get back to meal prep and planning   Long discussion about self care and sleep hygiene   Continue vitamins and protein supplements   Activity daily walking 10 minutes 3x/ day and outdoors preferred for mental health and sleep hygiene   Sleep hygiene reviewed   Follow-up in 3 months    Support group  Deedee Street verbalized understanding and questions were answered to the best of my knowledge and ability. Diet, activity and mindfulness educational materials were provided. 39 minutes spent in face to face with Deedee Street > 50% counseling.

## 2021-08-30 ENCOUNTER — TELEPHONE (OUTPATIENT)
Dept: SURGERY | Age: 31
End: 2021-08-30

## 2022-03-19 PROBLEM — E66.01 OBESITY, MORBID (HCC): Status: ACTIVE | Noted: 2020-01-02

## 2022-03-19 PROBLEM — Z34.90 PREGNANCY: Status: ACTIVE | Noted: 2019-01-12

## 2022-03-20 PROBLEM — E66.01 MORBID OBESITY WITH BMI OF 50.0-59.9, ADULT (HCC): Status: ACTIVE | Noted: 2020-11-03

## 2022-07-14 NOTE — PROGRESS NOTES
Checked on pt this afternoon  She is drinking but at 11 she felt tight and felt that things were \"sitting\" a bit, , has not done 16 ounces yet today. No nausea, no vomiting  She is having pain at RUQ incision site, heating pad and oral dilaudid has been helpful  She is oob in the chair, sipping from 1 ounce cups   BP (!) 149/87 (BP 1 Location: Right arm, BP Patient Position: Sitting)   Pulse 80   Temp 98.4 °F (36.9 °C)   Resp 16   Ht 5' 5.75\" (1.67 m)   Wt 146.6 kg (323 lb 1.6 oz)   SpO2 96%   Breastfeeding No   BMI 52.55 kg/m²      Pt will continue to work on drinking, levsin prn   lovenox teaching.    Continue ambulation   Further plan per Dr. Rin Griffin, PA What Is The Reason For Today's Visit?: History of Melanoma Year Excised?: 3/18 [place] Depth?: 0.25 mm

## 2022-08-29 ENCOUNTER — TELEPHONE (OUTPATIENT)
Dept: SURGERY | Age: 32
End: 2022-08-29

## 2025-07-09 NOTE — PROGRESS NOTES
Immunizations up to date  Pain:0   [facility]  Supervised Weight Loss     Date:   2020    Patient's Name: Renita Retana  : 1990    Insurance:  Fely Pichardo          Session: 2 of  6  Surgery: Sleeve Gastrectomy  Surgeon:  Denys Bravo    Height: 66\" Weight:    328       Lbs. BMI: 52.9   Pounds Lost since last month: 0               Pounds Gained since last month: 4    Starting Weight: 324   Previous Months Weight: 324  Overall Pounds Lost: 0 Overall Pounds Gained: 4    Other Pertinent Information: n/a    Smoking Status:  None  Alcohol Intake: none    I have reviewed with pt the guidelines of the supervised wt loss program.  Pt understands the expectations of some wt loss during the program and that wt gain could delay the process. I have also explained that appointments need to be consecutive and missing an appointment may result in starting over. Pt has received this information in writing. Changes that patient has made since last month include:  Eating to eat 3 meals per day consistently, increase physical activity at home and work. Eating Habits and Behaviors  General healthy eating guidelines were also discussed. Pts were instructed that their plate should be made up 1/2 plate coming from non-starchy vegetables, 1/4 coming from lean meat, and 1/4 of their plate coming from carbohydrates, including fruits, starches, or milk. We discussed measuring meals to 1/2 cup total per meal after surgery. Drinking only calorie-free, sugar-free and non-carbonated beverages. We discussed the importance of drinking 64 ounces of fluid per day to prevent dehydration post-operatively. Patient's current diet habits include: Pt eats 2-3 meals per day, skipping occasionally. Pt snacks between meals at work on granola bars, cheese its, rice cakes, cereal. Carbohydrate rich foods like chips pretzels, crackers eaten ~3x per week and pt eats [employer] cookies daily at work.  Pt eats mixture of baked grilled, broiled and sometimes fried foods. Eating out at restaurants 1-3 x a week. Pt unsure how much fluid she is drinking total. Drinks 16 oz of coffee per day with sugar. Denies emotional eating. Reports biggest challenge to weight loss is food choices. Physical Activity/Exercise  We talked about the importance of increasing daily physical activity and beginning to develop an exercise regimen/routine. We talked about exercise as being an important part of long term weight loss after surgery. Comments:  During class, I discussed with patient the importance of getting into an exercise routine. Pt is currently walking 4x per week at work for activity. Pt has been encouraged to increase intensity and frequency as tolerated. Behavior Modification       A behavior modification lesson was provided with an emphasis on developing mindful eating behaviors. We talked about how to eat more mindfully and identify emotional eating triggers. Tips and recommendations for how to make these changes were provided. Pt was encouraged to keep a food journal and record what they were taking in daily. Overall Assessment: Pt demonstrates small/appropriate lifestyle changes evidenced mostly by reported changes. Will continue to assess. Patient-Set Goals:   1. Nutrition - \"eat less carbs and make better choices of snacks. \" reduce coffee intake, drink more water. 2. Exercise - Go to track to walk/jog  3. Behavior - \"be more humble, the process should be smoother. \" Meditate.      Clemencia Han, [address] [facility] [address]  2/12/2020

## (undated) DEVICE — GOWN,SIRUS,FABRNF,XL,20/CS: Brand: MEDLINE

## (undated) DEVICE — SUTURE SZ 0 27IN 5/8 CIR UR-6  TAPER PT VIOLET ABSRB VICRYL J603H

## (undated) DEVICE — PREP SKN CHLRAPRP APL 26ML STR --

## (undated) DEVICE — FILTER SMK EVAC FLO CLR MEGADYNE

## (undated) DEVICE — POWER SHELL: Brand: SIGNIA

## (undated) DEVICE — VISIGI 3D®  CALIBRATION SYSTEM  SIZE 40FR STD W/ BULB: Brand: BOEHRINGER® VISIGI 3D™ SLEEVE GASTRECTOMY CALIBRATION SYSTEM, SIZE 40FR W/BULB

## (undated) DEVICE — INFECTION CONTROL KIT SYS

## (undated) DEVICE — GARMENT,MEDLINE,DVT,INT,CALF,MED, GEN2: Brand: MEDLINE

## (undated) DEVICE — SUTURE DEV SZ 2-0 WND CLSR ABSRB GS-22 VLOC COVIDIEN VLOCM2145

## (undated) DEVICE — DRAPE,UTILTY,TAPE,15X26, 4EA/PK: Brand: MEDLINE

## (undated) DEVICE — Device

## (undated) DEVICE — 4-PORT MANIFOLD: Brand: NEPTUNE 2

## (undated) DEVICE — BNDG ADH FABRIC 2X4IN ST LF --

## (undated) DEVICE — LAPAROSCOPIC TROCAR SLEEVE/SINGLE USE: Brand: KII® OPTICAL ACCESS SYSTEM

## (undated) DEVICE — BLACK REINFORCED INTELLIGENT RELOAD, FOR USE WITH SIGNIA STAPLING SYSTEM: Brand: TRI-STAPLE 2.0

## (undated) DEVICE — STERILE POLYISOPRENE POWDER-FREE SURGICAL GLOVES WITH EMOLLIENT COATING: Brand: PROTEXIS

## (undated) DEVICE — NEEDLE HYPO 22GA L1.5IN BLK S STL HUB POLYPR SHLD REG BVL

## (undated) DEVICE — TROCAR: Brand: KII® SLEEVE

## (undated) DEVICE — Z INACTIVE USE 2240337 DRAPE SURG PT TRANSFER TRAWAY SHT

## (undated) DEVICE — SYR 50ML LR LCK 1ML GRAD NSAF --

## (undated) DEVICE — TROCARS: Brand: KII® OPTICAL ACCESS SYSTEM

## (undated) DEVICE — AIR SHEET,LAT,COMFORT GLIDE, BLEND 40X80: Brand: MEDLINE

## (undated) DEVICE — SURGICAL PROCEDURE KIT GEN LAPAROSCOPY LF

## (undated) DEVICE — CLICKLINE SCISSORS INSERT: Brand: CLICKLINE

## (undated) DEVICE — VISUALIZATION SYSTEM: Brand: CLEARIFY

## (undated) DEVICE — REM POLYHESIVE ADULT PATIENT RETURN ELECTRODE: Brand: VALLEYLAB

## (undated) DEVICE — REINFORCED INTELLIGENT RELOAD, FOR USE WITH SIGNIA STAPLING SYSTEM: Brand: TRI-STAPLE 2.0

## (undated) DEVICE — TROCAR SITE CLOSURE DEVICE: Brand: ENDO CLOSE

## (undated) DEVICE — STRAP,POSITIONING,KNEE/BODY,FOAM,4X60": Brand: MEDLINE

## (undated) DEVICE — DISSECTOR CRV JAW 48CM CRDLS -- SONICISION

## (undated) DEVICE — CLIP APPLIER WITH CLIP LOGIC TECHNOLOGY: Brand: ENDO CLIP III

## (undated) DEVICE — SOLUTION IV 1000ML 0.9% SOD CHL

## (undated) DEVICE — SUTURE MCRYL SZ 4-0 L27IN ABSRB UD L24MM PS-1 3/8 CIR PRIM Y935H

## (undated) DEVICE — DERMABOND SKIN ADH 0.7ML -- DERMABOND ADVANCED 12/BX

## (undated) DEVICE — TUBING, SUCTION, 1/4" X 12', STRAIGHT: Brand: MEDLINE

## (undated) DEVICE — ENDO CARRY-ON PROCEDURE KIT INCLUDES ENZYMATIC SPONGE, GAUZE, BIOHAZARD LABEL, TRAY, LUBRICANT, DIRTY SCOPE LABEL, WATER LABEL, TRAY, DRAWSTRING PAD, AND DEFENDO 4-PIECE KIT.: Brand: ENDO CARRY-ON PROCEDURE KIT